# Patient Record
Sex: MALE | Race: WHITE | NOT HISPANIC OR LATINO | Employment: UNEMPLOYED | URBAN - METROPOLITAN AREA
[De-identification: names, ages, dates, MRNs, and addresses within clinical notes are randomized per-mention and may not be internally consistent; named-entity substitution may affect disease eponyms.]

---

## 2017-01-16 PROBLEM — M47.812 CERVICAL SPONDYLOARTHRITIS: Status: ACTIVE | Noted: 2017-01-16

## 2017-02-08 PROBLEM — M79.18 MYOFASCIAL PAIN: Status: ACTIVE | Noted: 2017-02-08

## 2017-02-23 NOTE — TELEPHONE ENCOUNTER
----- Message from Kendra Kirby sent at 2/23/2017  1:55 PM CST -----  Pricila, wife, calling for a refill on triamcinolone acetonide 0.1% (KENALOG) 0.1 % cream. Please send to [facility]. Patient wife can be reached at [phone] Thank you!

## 2017-02-24 RX ORDER — TRIAMCINOLONE ACETONIDE 1 MG/G
CREAM TOPICAL 3 TIMES DAILY
Qty: 454 G | Refills: 12 | Status: SHIPPED | OUTPATIENT
Start: 2017-02-24 | End: 2019-01-03 | Stop reason: SDUPTHER

## 2017-03-30 PROBLEM — M47.816 FACET ARTHRITIS, DEGENERATIVE, LUMBAR SPINE: Status: ACTIVE | Noted: 2017-03-30

## 2017-04-26 PROBLEM — M47.816 FACET HYPERTROPHY OF LUMBAR REGION: Status: ACTIVE | Noted: 2017-04-26

## 2017-04-26 PROBLEM — M47.816 SPONDYLOSIS OF LUMBAR REGION WITHOUT MYELOPATHY OR RADICULOPATHY: Chronic | Status: ACTIVE | Noted: 2017-04-26

## 2017-08-09 PROBLEM — M54.12 CERVICAL RADICULOPATHY: Status: ACTIVE | Noted: 2017-08-09

## 2017-09-21 PROBLEM — M48.061 BILATERAL STENOSIS OF LATERAL RECESS OF LUMBAR SPINE: Status: ACTIVE | Noted: 2017-09-21

## 2017-10-12 ENCOUNTER — TELEPHONE (OUTPATIENT)
Dept: FAMILY MEDICINE | Facility: CLINIC | Age: 62
End: 2017-10-12

## 2017-10-12 DIAGNOSIS — Z12.5 SCREENING FOR PROSTATE CANCER: ICD-10-CM

## 2017-10-12 DIAGNOSIS — Z00.00 ROUTINE MEDICAL EXAM: Primary | ICD-10-CM

## 2017-10-12 NOTE — TELEPHONE ENCOUNTER
Per GAGANDEEP Pizano pt had to reschedule appt but also needs labs done please put in orders and I will set before appt with Dr. DONIS .

## 2017-10-24 RX ORDER — MONTELUKAST SODIUM 10 MG/1
TABLET ORAL
Qty: 90 TABLET | Refills: 3 | Status: SHIPPED | OUTPATIENT
Start: 2017-10-24 | End: 2019-01-03 | Stop reason: SDUPTHER

## 2017-11-04 ENCOUNTER — LAB VISIT (OUTPATIENT)
Dept: LAB | Facility: HOSPITAL | Age: 62
End: 2017-11-04
Attending: INTERNAL MEDICINE
Payer: COMMERCIAL

## 2017-11-04 DIAGNOSIS — Z12.5 SCREENING FOR PROSTATE CANCER: ICD-10-CM

## 2017-11-04 DIAGNOSIS — Z00.00 ROUTINE MEDICAL EXAM: ICD-10-CM

## 2017-11-04 LAB
ALBUMIN SERPL BCP-MCNC: 4.1 G/DL
ALP SERPL-CCNC: 70 U/L
ALT SERPL W/O P-5'-P-CCNC: 23 U/L
ANION GAP SERPL CALC-SCNC: 13 MMOL/L
AST SERPL-CCNC: 23 U/L
BILIRUB SERPL-MCNC: 0.6 MG/DL
BUN SERPL-MCNC: 17 MG/DL
CALCIUM SERPL-MCNC: 9.7 MG/DL
CHLORIDE SERPL-SCNC: 106 MMOL/L
CO2 SERPL-SCNC: 25 MMOL/L
COMPLEXED PSA SERPL-MCNC: 1.2 NG/ML
CREAT SERPL-MCNC: 1 MG/DL
EST. GFR  (AFRICAN AMERICAN): >60 ML/MIN/1.73 M^2
EST. GFR  (NON AFRICAN AMERICAN): >60 ML/MIN/1.73 M^2
ESTIMATED AVG GLUCOSE: 123 MG/DL
GLUCOSE SERPL-MCNC: 92 MG/DL
HBA1C MFR BLD HPLC: 5.9 %
POTASSIUM SERPL-SCNC: 4.5 MMOL/L
PROT SERPL-MCNC: 7.8 G/DL
SODIUM SERPL-SCNC: 144 MMOL/L
TSH SERPL DL<=0.005 MIU/L-ACNC: 1.94 UIU/ML

## 2017-11-04 PROCEDURE — 83036 HEMOGLOBIN GLYCOSYLATED A1C: CPT

## 2017-11-04 PROCEDURE — 80053 COMPREHEN METABOLIC PANEL: CPT

## 2017-11-04 PROCEDURE — 36415 COLL VENOUS BLD VENIPUNCTURE: CPT | Mod: PO

## 2017-11-04 PROCEDURE — 84443 ASSAY THYROID STIM HORMONE: CPT

## 2017-11-04 PROCEDURE — 84153 ASSAY OF PSA TOTAL: CPT

## 2017-11-13 ENCOUNTER — OFFICE VISIT (OUTPATIENT)
Dept: FAMILY MEDICINE | Facility: CLINIC | Age: 62
End: 2017-11-13
Payer: COMMERCIAL

## 2017-11-13 VITALS
SYSTOLIC BLOOD PRESSURE: 140 MMHG | WEIGHT: 197.75 LBS | TEMPERATURE: 98 F | HEART RATE: 65 BPM | OXYGEN SATURATION: 94 % | HEIGHT: 66 IN | DIASTOLIC BLOOD PRESSURE: 100 MMHG | BODY MASS INDEX: 31.78 KG/M2

## 2017-11-13 DIAGNOSIS — E78.5 HYPERLIPIDEMIA, UNSPECIFIED HYPERLIPIDEMIA TYPE: ICD-10-CM

## 2017-11-13 DIAGNOSIS — Z12.5 SCREENING FOR PROSTATE CANCER: ICD-10-CM

## 2017-11-13 DIAGNOSIS — I10 ESSENTIAL HYPERTENSION: ICD-10-CM

## 2017-11-13 DIAGNOSIS — Z00.00 ROUTINE MEDICAL EXAM: Primary | ICD-10-CM

## 2017-11-13 PROCEDURE — 99396 PREV VISIT EST AGE 40-64: CPT | Mod: S$GLB,,, | Performed by: INTERNAL MEDICINE

## 2017-11-13 PROCEDURE — 99999 PR PBB SHADOW E&M-EST. PATIENT-LVL III: CPT | Mod: PBBFAC,,, | Performed by: INTERNAL MEDICINE

## 2017-11-13 RX ORDER — METHOCARBAMOL 750 MG/1
TABLET, FILM COATED ORAL
COMMUNITY
Start: 2017-10-25 | End: 2020-01-10 | Stop reason: SDUPTHER

## 2017-11-13 RX ORDER — AMLODIPINE BESYLATE 5 MG/1
5 TABLET ORAL DAILY
Qty: 30 TABLET | Refills: 11 | Status: SHIPPED | OUTPATIENT
Start: 2017-11-13 | End: 2018-07-17 | Stop reason: SDUPTHER

## 2017-11-13 RX ORDER — HYDROCODONE BITARTRATE AND ACETAMINOPHEN 10; 325 MG/1; MG/1
TABLET ORAL
COMMUNITY
Start: 2017-10-24 | End: 2018-12-12
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
## 2017-11-15 RX ORDER — VALSARTAN 320 MG/1
320 TABLET ORAL DAILY
Qty: 90 TABLET | Refills: 3 | Status: SHIPPED | OUTPATIENT
Start: 2017-11-15 | End: 2018-12-12

## 2017-11-15 NOTE — TELEPHONE ENCOUNTER
----- Message from Kia Means sent at 11/15/2017  9:57 AM CST -----  Contact: Self  Refill : valsartan (DIOVAN) 320 MG tablet ()      Pharmacy     [id number]  [place], [place] - [address] AT [facility] [address]

## 2018-07-18 RX ORDER — AMLODIPINE BESYLATE 5 MG/1
TABLET ORAL
Qty: 30 TABLET | Refills: 0 | Status: SHIPPED | OUTPATIENT
Start: 2018-07-18 | End: 2018-12-12 | Stop reason: SDUPTHER

## 2018-07-27 RX ORDER — IRBESARTAN 300 MG/1
300 TABLET ORAL NIGHTLY
Qty: 90 TABLET | Refills: 3 | Status: SHIPPED | OUTPATIENT
Start: 2018-07-27 | End: 2019-01-03 | Stop reason: SDUPTHER

## 2018-07-27 NOTE — TELEPHONE ENCOUNTER
----- Message from Kia Means sent at 7/27/2018  8:09 AM CDT -----  Contact: sELF   Pt calling to speak to a nurse regarding letter that he received regarding changing med below. [phone].      valsartan (DIOVAN) 320 MG tablet

## 2018-11-13 RX ORDER — AMLODIPINE BESYLATE 5 MG/1
TABLET ORAL
Qty: 30 TABLET | Refills: 0 | Status: SHIPPED | OUTPATIENT
Start: 2018-11-13 | End: 2019-01-03 | Stop reason: SDUPTHER

## 2018-11-28 ENCOUNTER — TELEPHONE (OUTPATIENT)
Dept: FAMILY MEDICINE | Facility: CLINIC | Age: 63
End: 2018-11-28

## 2018-11-28 DIAGNOSIS — I10 ESSENTIAL HYPERTENSION: ICD-10-CM

## 2018-11-28 DIAGNOSIS — Z00.00 ROUTINE MEDICAL EXAM: ICD-10-CM

## 2018-11-28 DIAGNOSIS — E78.6 HDL LIPOPROTEIN DEFICIENCY: ICD-10-CM

## 2018-11-28 DIAGNOSIS — M48.061 BILATERAL STENOSIS OF LATERAL RECESS OF LUMBAR SPINE: Primary | ICD-10-CM

## 2018-11-28 DIAGNOSIS — E55.9 VITAMIN D DEFICIENCY DISEASE: ICD-10-CM

## 2018-11-28 NOTE — TELEPHONE ENCOUNTER
----- Message from Mohsen Rainey sent at 11/28/2018  8:25 AM CST -----  Contact: Pricila/[phone]  The patient wife would like orders placed for blood work.        Thank you

## 2018-12-12 ENCOUNTER — OFFICE VISIT (OUTPATIENT)
Dept: FAMILY MEDICINE | Facility: CLINIC | Age: 63
End: 2018-12-12
Payer: COMMERCIAL

## 2018-12-12 ENCOUNTER — LAB VISIT (OUTPATIENT)
Dept: LAB | Facility: HOSPITAL | Age: 63
End: 2018-12-12
Attending: INTERNAL MEDICINE
Payer: COMMERCIAL

## 2018-12-12 VITALS
BODY MASS INDEX: 31.43 KG/M2 | HEART RATE: 90 BPM | TEMPERATURE: 98 F | WEIGHT: 195.56 LBS | HEIGHT: 66 IN | DIASTOLIC BLOOD PRESSURE: 88 MMHG | OXYGEN SATURATION: 96 % | SYSTOLIC BLOOD PRESSURE: 138 MMHG

## 2018-12-12 DIAGNOSIS — Z00.00 ROUTINE MEDICAL EXAM: Primary | ICD-10-CM

## 2018-12-12 DIAGNOSIS — E78.6 HDL LIPOPROTEIN DEFICIENCY: ICD-10-CM

## 2018-12-12 DIAGNOSIS — I10 ESSENTIAL HYPERTENSION: ICD-10-CM

## 2018-12-12 DIAGNOSIS — Z12.5 SCREENING FOR PROSTATE CANCER: ICD-10-CM

## 2018-12-12 DIAGNOSIS — Z12.11 SCREENING FOR COLORECTAL CANCER: ICD-10-CM

## 2018-12-12 DIAGNOSIS — E55.9 VITAMIN D DEFICIENCY DISEASE: ICD-10-CM

## 2018-12-12 DIAGNOSIS — E78.5 HYPERLIPIDEMIA, UNSPECIFIED HYPERLIPIDEMIA TYPE: ICD-10-CM

## 2018-12-12 DIAGNOSIS — Z00.00 ROUTINE MEDICAL EXAM: ICD-10-CM

## 2018-12-12 DIAGNOSIS — K21.9 GASTROESOPHAGEAL REFLUX DISEASE, ESOPHAGITIS PRESENCE NOT SPECIFIED: ICD-10-CM

## 2018-12-12 DIAGNOSIS — Z12.12 SCREENING FOR COLORECTAL CANCER: ICD-10-CM

## 2018-12-12 LAB
25(OH)D3+25(OH)D2 SERPL-MCNC: 39 NG/ML
ALBUMIN SERPL BCP-MCNC: 4 G/DL
ALP SERPL-CCNC: 84 U/L
ALT SERPL W/O P-5'-P-CCNC: 21 U/L
ANION GAP SERPL CALC-SCNC: 8 MMOL/L
AST SERPL-CCNC: 19 U/L
BASOPHILS # BLD AUTO: 0.07 K/UL
BASOPHILS NFR BLD: 0.8 %
BILIRUB SERPL-MCNC: 0.6 MG/DL
BUN SERPL-MCNC: 15 MG/DL
CALCIUM SERPL-MCNC: 9.6 MG/DL
CHLORIDE SERPL-SCNC: 107 MMOL/L
CHOLEST SERPL-MCNC: 195 MG/DL
CHOLEST/HDLC SERPL: 4.6 {RATIO}
CO2 SERPL-SCNC: 27 MMOL/L
COMPLEXED PSA SERPL-MCNC: 1 NG/ML
CREAT SERPL-MCNC: 0.9 MG/DL
DIFFERENTIAL METHOD: NORMAL
EOSINOPHIL # BLD AUTO: 0.3 K/UL
EOSINOPHIL NFR BLD: 3.7 %
ERYTHROCYTE [DISTWIDTH] IN BLOOD BY AUTOMATED COUNT: 14.2 %
EST. GFR  (AFRICAN AMERICAN): >60 ML/MIN/1.73 M^2
EST. GFR  (NON AFRICAN AMERICAN): >60 ML/MIN/1.73 M^2
GLUCOSE SERPL-MCNC: 112 MG/DL
HCT VFR BLD AUTO: 47.8 %
HDLC SERPL-MCNC: 42 MG/DL
HDLC SERPL: 21.5 %
HGB BLD-MCNC: 15.7 G/DL
IMM GRANULOCYTES # BLD AUTO: 0.04 K/UL
IMM GRANULOCYTES NFR BLD AUTO: 0.5 %
LDLC SERPL CALC-MCNC: 132.6 MG/DL
LYMPHOCYTES # BLD AUTO: 2.4 K/UL
LYMPHOCYTES NFR BLD: 28.1 %
MCH RBC QN AUTO: 28.6 PG
MCHC RBC AUTO-ENTMCNC: 32.8 G/DL
MCV RBC AUTO: 87 FL
MONOCYTES # BLD AUTO: 0.6 K/UL
MONOCYTES NFR BLD: 7.2 %
NEUTROPHILS # BLD AUTO: 5.2 K/UL
NEUTROPHILS NFR BLD: 59.7 %
NONHDLC SERPL-MCNC: 153 MG/DL
NRBC BLD-RTO: 0 /100 WBC
PLATELET # BLD AUTO: 235 K/UL
PMV BLD AUTO: 12.3 FL
POTASSIUM SERPL-SCNC: 4.5 MMOL/L
PROT SERPL-MCNC: 7.3 G/DL
RBC # BLD AUTO: 5.48 M/UL
SODIUM SERPL-SCNC: 142 MMOL/L
T4 FREE SERPL-MCNC: 0.81 NG/DL
TRIGL SERPL-MCNC: 102 MG/DL
TSH SERPL DL<=0.005 MIU/L-ACNC: 6.03 UIU/ML
WBC # BLD AUTO: 8.62 K/UL

## 2018-12-12 PROCEDURE — 84439 ASSAY OF FREE THYROXINE: CPT

## 2018-12-12 PROCEDURE — 99396 PREV VISIT EST AGE 40-64: CPT | Mod: S$GLB,,, | Performed by: INTERNAL MEDICINE

## 2018-12-12 PROCEDURE — 85025 COMPLETE CBC W/AUTO DIFF WBC: CPT

## 2018-12-12 PROCEDURE — 84153 ASSAY OF PSA TOTAL: CPT

## 2018-12-12 PROCEDURE — 99999 PR PBB SHADOW E&M-EST. PATIENT-LVL III: CPT | Mod: PBBFAC,,, | Performed by: INTERNAL MEDICINE

## 2018-12-12 PROCEDURE — 82306 VITAMIN D 25 HYDROXY: CPT

## 2018-12-12 PROCEDURE — 84443 ASSAY THYROID STIM HORMONE: CPT

## 2018-12-12 PROCEDURE — 36415 COLL VENOUS BLD VENIPUNCTURE: CPT | Mod: PO

## 2018-12-12 PROCEDURE — 80061 LIPID PANEL: CPT

## 2018-12-12 PROCEDURE — 80053 COMPREHEN METABOLIC PANEL: CPT

## 2018-12-12 RX ORDER — PANTOPRAZOLE SODIUM 40 MG/1
40 TABLET, DELAYED RELEASE ORAL
Qty: 90 TABLET | Refills: 12 | Status: SHIPPED | OUTPATIENT
Start: 2018-12-12 | End: 2019-02-27 | Stop reason: SDUPTHER
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
## 2018-12-16 ENCOUNTER — PATIENT MESSAGE (OUTPATIENT)
Dept: FAMILY MEDICINE | Facility: CLINIC | Age: 63
End: 2018-12-16

## 2018-12-20 ENCOUNTER — PATIENT MESSAGE (OUTPATIENT)
Dept: FAMILY MEDICINE | Facility: CLINIC | Age: 63
End: 2018-12-20

## 2019-01-03 NOTE — TELEPHONE ENCOUNTER
----- Message from Shamika Leija sent at 1/3/2019  3:40 PM CST -----  Contact: wife  Pts wife is calling to speak with staff about why pts amLODIPine (NORVASC) 5 MG tablet and irbesartan (AVAPRO) 300 MG tablet and montelukast (SINGULAIR) 10 mg tablet. Please call pt at [phone]       Pharmacy - [facility] - [address] AT [address] to [place] [id number]  [facility] [address]  Phone: [phone] Fax: [phone]

## 2019-01-04 RX ORDER — TRIAMCINOLONE ACETONIDE 1 MG/G
CREAM TOPICAL 3 TIMES DAILY
Qty: 454 G | Refills: 0 | Status: SHIPPED | OUTPATIENT
Start: 2019-01-04 | End: 2019-03-21 | Stop reason: SDUPTHER

## 2019-01-04 RX ORDER — MONTELUKAST SODIUM 10 MG/1
10 TABLET ORAL DAILY
Qty: 90 TABLET | Refills: 0 | Status: SHIPPED | OUTPATIENT
Start: 2019-01-04 | End: 2019-02-27 | Stop reason: SDUPTHER

## 2019-01-04 RX ORDER — AMLODIPINE BESYLATE 5 MG/1
TABLET ORAL
Qty: 30 TABLET | Refills: 0 | Status: SHIPPED | OUTPATIENT
Start: 2019-01-04 | End: 2019-02-27 | Stop reason: SDUPTHER

## 2019-01-04 RX ORDER — IRBESARTAN 300 MG/1
300 TABLET ORAL NIGHTLY
Qty: 90 TABLET | Refills: 0 | Status: SHIPPED | OUTPATIENT
Start: 2019-01-04 | End: 2019-02-27 | Stop reason: SDUPTHER

## 2019-02-27 NOTE — TELEPHONE ENCOUNTER
----- Message from Kayleigh Hill sent at 2/26/2019  2:43 PM CST -----  Contact: GERTRUDIS ZIMMERMAN [[id number]]  Can the clinic reply in [facility]:N    Please refill the medication(s) listed below.     Please call the patient when the prescription(s) is ready for  at the phone number   [phone]    Pt requesting 90 day supply for following refills:    Medication #1:pantoprazole (PROTONIX) 40 MG tablet      Medication #2:irbesartan (AVAPRO) 300 MG tablet    Medication#3:montelukast (SINGULAIR) 10 mg tablet    Medication#4:amLODIPine (NORVASC) 5 MG tablet    Preferred Pharmacy:[facility] - [place], [place] - [address] AT [address] to [facility] [phone]

## 2019-02-28 RX ORDER — PANTOPRAZOLE SODIUM 40 MG/1
40 TABLET, DELAYED RELEASE ORAL
Qty: 90 TABLET | Refills: 12 | Status: SHIPPED | OUTPATIENT
Start: 2019-02-28 | End: 2020-01-10 | Stop reason: SDUPTHER

## 2019-02-28 RX ORDER — AMLODIPINE BESYLATE 5 MG/1
TABLET ORAL
Qty: 30 TABLET | Refills: 12 | Status: SHIPPED | OUTPATIENT
Start: 2019-02-28 | End: 2020-01-10

## 2019-02-28 RX ORDER — MONTELUKAST SODIUM 10 MG/1
10 TABLET ORAL DAILY
Qty: 90 TABLET | Refills: 12 | Status: SHIPPED | OUTPATIENT
Start: 2019-02-28 | End: 2020-01-10 | Stop reason: SDUPTHER

## 2019-02-28 RX ORDER — IRBESARTAN 300 MG/1
300 TABLET ORAL NIGHTLY
Qty: 90 TABLET | Refills: 12 | Status: SHIPPED | OUTPATIENT
Start: 2019-02-28 | End: 2021-08-05 | Stop reason: SDUPTHER

## 2019-03-21 RX ORDER — TRIAMCINOLONE ACETONIDE 1 MG/G
CREAM TOPICAL
Qty: 454 G | Refills: 12 | Status: SHIPPED | OUTPATIENT
Start: 2019-03-21 | End: 2024-03-04

## 2019-03-21 RX ORDER — MONTELUKAST SODIUM 10 MG/1
TABLET ORAL
Qty: 90 TABLET | Refills: 12 | Status: SHIPPED | OUTPATIENT
Start: 2019-03-21 | End: 2021-08-05 | Stop reason: SDUPTHER

## 2019-03-21 RX ORDER — AMLODIPINE BESYLATE 5 MG/1
TABLET ORAL
Qty: 30 TABLET | Refills: 12 | Status: SHIPPED | OUTPATIENT
Start: 2019-03-21 | End: 2020-01-10 | Stop reason: SDUPTHER

## 2019-03-21 RX ORDER — IRBESARTAN 300 MG/1
TABLET ORAL
Qty: 90 TABLET | Refills: 12 | Status: SHIPPED | OUTPATIENT
Start: 2019-03-21 | End: 2022-01-12 | Stop reason: SDUPTHER

## 2019-08-20 ENCOUNTER — TELEPHONE (OUTPATIENT)
Dept: FAMILY MEDICINE | Facility: CLINIC | Age: 64
End: 2019-08-20

## 2019-08-21 ENCOUNTER — TELEPHONE (OUTPATIENT)
Dept: FAMILY MEDICINE | Facility: CLINIC | Age: 64
End: 2019-08-21

## 2019-08-21 NOTE — TELEPHONE ENCOUNTER
----- Message from Christine Young sent at 8/21/2019  9:09 AM CDT -----  Contact: Self   Type:  Patient Returning Call    Who Called:  Self     Who Left Message for Patient: Dahlia     Does the patient know what this is regarding? No     Would the patient rather a call back or a response via [facility]?  Call     Best Call Back Number: [phone]

## 2019-11-01 RX ORDER — IRBESARTAN 300 MG/1
TABLET ORAL
Qty: 30 TABLET | Refills: 0 | Status: SHIPPED | OUTPATIENT
Start: 2019-11-01 | End: 2021-08-05 | Stop reason: SDUPTHER

## 2019-12-31 ENCOUNTER — TELEPHONE (OUTPATIENT)
Dept: FAMILY MEDICINE | Facility: CLINIC | Age: 64
End: 2019-12-31

## 2019-12-31 DIAGNOSIS — Z00.00 ROUTINE MEDICAL EXAM: Primary | ICD-10-CM

## 2020-01-06 NOTE — TELEPHONE ENCOUNTER
"----- Message from Heather Velasco sent at 12/31/2019 10:11 AM CST -----  Contact: Pricila"wife" [phone]  Type: Lab    Caller is requesting to schedule their Lab appointment prior to annual appointment.    Order is not listed in EPIC.  Please enter order and contact patient to schedule.    Name of Caller:Pricila "wife"    Preferred Date and Time of Labs:1 wk prior to appointment    Date of [facility] Appointment:1/10/20    Where would they like the lab performed? [facility]    Would the patient rather a call back or a response via [facility]? Call back    Best Call Back Number: [phone]      "
Fasting labs in  
Lab appt scheduled  
(0) independent

## 2020-01-07 ENCOUNTER — LAB VISIT (OUTPATIENT)
Dept: LAB | Facility: HOSPITAL | Age: 65
End: 2020-01-07
Attending: INTERNAL MEDICINE
Payer: COMMERCIAL

## 2020-01-07 DIAGNOSIS — Z00.00 ROUTINE MEDICAL EXAM: ICD-10-CM

## 2020-01-07 LAB
ALBUMIN SERPL BCP-MCNC: 4.3 G/DL (ref 3.5–5.2)
ALP SERPL-CCNC: 69 U/L (ref 55–135)
ALT SERPL W/O P-5'-P-CCNC: 27 U/L (ref 10–44)
ANION GAP SERPL CALC-SCNC: 9 MMOL/L (ref 8–16)
AST SERPL-CCNC: 24 U/L (ref 10–40)
BASOPHILS # BLD AUTO: 0.07 K/UL (ref 0–0.2)
BASOPHILS NFR BLD: 0.8 % (ref 0–1.9)
BILIRUB SERPL-MCNC: 0.7 MG/DL (ref 0.1–1)
BUN SERPL-MCNC: 18 MG/DL (ref 8–23)
CALCIUM SERPL-MCNC: 9.4 MG/DL (ref 8.7–10.5)
CHLORIDE SERPL-SCNC: 104 MMOL/L (ref 95–110)
CHOLEST SERPL-MCNC: 216 MG/DL (ref 120–199)
CHOLEST/HDLC SERPL: 4.6 {RATIO} (ref 2–5)
CO2 SERPL-SCNC: 26 MMOL/L (ref 23–29)
CREAT SERPL-MCNC: 1 MG/DL (ref 0.5–1.4)
DIFFERENTIAL METHOD: NORMAL
EOSINOPHIL # BLD AUTO: 0.4 K/UL (ref 0–0.5)
EOSINOPHIL NFR BLD: 4.5 % (ref 0–8)
ERYTHROCYTE [DISTWIDTH] IN BLOOD BY AUTOMATED COUNT: 14.4 % (ref 11.5–14.5)
EST. GFR  (AFRICAN AMERICAN): >60 ML/MIN/1.73 M^2
EST. GFR  (NON AFRICAN AMERICAN): >60 ML/MIN/1.73 M^2
ESTIMATED AVG GLUCOSE: 143 MG/DL (ref 68–131)
GLUCOSE SERPL-MCNC: 110 MG/DL (ref 70–110)
HBA1C MFR BLD HPLC: 6.6 % (ref 4–5.6)
HCT VFR BLD AUTO: 46.3 % (ref 40–54)
HDLC SERPL-MCNC: 47 MG/DL (ref 40–75)
HDLC SERPL: 21.8 % (ref 20–50)
HGB BLD-MCNC: 14.8 G/DL (ref 14–18)
IMM GRANULOCYTES # BLD AUTO: 0.02 K/UL (ref 0–0.04)
IMM GRANULOCYTES NFR BLD AUTO: 0.2 % (ref 0–0.5)
LDLC SERPL CALC-MCNC: 136.4 MG/DL (ref 63–159)
LYMPHOCYTES # BLD AUTO: 2.4 K/UL (ref 1–4.8)
LYMPHOCYTES NFR BLD: 28.9 % (ref 18–48)
MCH RBC QN AUTO: 28.8 PG (ref 27–31)
MCHC RBC AUTO-ENTMCNC: 32 G/DL (ref 32–36)
MCV RBC AUTO: 90 FL (ref 82–98)
MONOCYTES # BLD AUTO: 0.6 K/UL (ref 0.3–1)
MONOCYTES NFR BLD: 7.4 % (ref 4–15)
NEUTROPHILS # BLD AUTO: 4.9 K/UL (ref 1.8–7.7)
NEUTROPHILS NFR BLD: 58.2 % (ref 38–73)
NONHDLC SERPL-MCNC: 169 MG/DL
NRBC BLD-RTO: 0 /100 WBC
PLATELET # BLD AUTO: 229 K/UL (ref 150–350)
PMV BLD AUTO: 12.1 FL (ref 9.2–12.9)
POTASSIUM SERPL-SCNC: 3.8 MMOL/L (ref 3.5–5.1)
PROT SERPL-MCNC: 7.6 G/DL (ref 6–8.4)
RBC # BLD AUTO: 5.14 M/UL (ref 4.6–6.2)
SODIUM SERPL-SCNC: 139 MMOL/L (ref 136–145)
T4 FREE SERPL-MCNC: 0.88 NG/DL (ref 0.71–1.51)
TRIGL SERPL-MCNC: 163 MG/DL (ref 30–150)
TSH SERPL DL<=0.005 MIU/L-ACNC: 3.31 UIU/ML (ref 0.4–4)
WBC # BLD AUTO: 8.41 K/UL (ref 3.9–12.7)

## 2020-01-07 PROCEDURE — 36415 COLL VENOUS BLD VENIPUNCTURE: CPT | Mod: PO

## 2020-01-07 PROCEDURE — 82306 VITAMIN D 25 HYDROXY: CPT

## 2020-01-07 PROCEDURE — 84439 ASSAY OF FREE THYROXINE: CPT

## 2020-01-07 PROCEDURE — 83036 HEMOGLOBIN GLYCOSYLATED A1C: CPT

## 2020-01-07 PROCEDURE — 85025 COMPLETE CBC W/AUTO DIFF WBC: CPT

## 2020-01-07 PROCEDURE — 80061 LIPID PANEL: CPT

## 2020-01-07 PROCEDURE — 80053 COMPREHEN METABOLIC PANEL: CPT

## 2020-01-07 PROCEDURE — 84443 ASSAY THYROID STIM HORMONE: CPT

## 2020-01-07 PROCEDURE — 84153 ASSAY OF PSA TOTAL: CPT

## 2020-01-08 LAB
25(OH)D3+25(OH)D2 SERPL-MCNC: 35 NG/ML (ref 30–96)
COMPLEXED PSA SERPL-MCNC: 1.2 NG/ML (ref 0–4)

## 2020-01-10 ENCOUNTER — OFFICE VISIT (OUTPATIENT)
Dept: FAMILY MEDICINE | Facility: CLINIC | Age: 65
End: 2020-01-10
Payer: COMMERCIAL

## 2020-01-10 VITALS
TEMPERATURE: 98 F | WEIGHT: 191.81 LBS | HEIGHT: 66 IN | DIASTOLIC BLOOD PRESSURE: 88 MMHG | OXYGEN SATURATION: 96 % | SYSTOLIC BLOOD PRESSURE: 118 MMHG | BODY MASS INDEX: 30.82 KG/M2 | HEART RATE: 67 BPM

## 2020-01-10 DIAGNOSIS — I10 ESSENTIAL HYPERTENSION: ICD-10-CM

## 2020-01-10 DIAGNOSIS — E11.9 NEWLY DIAGNOSED DIABETES: ICD-10-CM

## 2020-01-10 DIAGNOSIS — E78.5 HYPERLIPIDEMIA, UNSPECIFIED HYPERLIPIDEMIA TYPE: ICD-10-CM

## 2020-01-10 DIAGNOSIS — Z12.5 SCREENING FOR PROSTATE CANCER: ICD-10-CM

## 2020-01-10 DIAGNOSIS — Z00.00 ROUTINE MEDICAL EXAM: Primary | ICD-10-CM

## 2020-01-10 DIAGNOSIS — E78.6 HDL LIPOPROTEIN DEFICIENCY: ICD-10-CM

## 2020-01-10 DIAGNOSIS — K21.9 GASTROESOPHAGEAL REFLUX DISEASE, ESOPHAGITIS PRESENCE NOT SPECIFIED: ICD-10-CM

## 2020-01-10 DIAGNOSIS — E55.9 VITAMIN D DEFICIENCY DISEASE: ICD-10-CM

## 2020-01-10 DIAGNOSIS — M1A.9XX0 CHRONIC GOUT WITHOUT TOPHUS, UNSPECIFIED CAUSE, UNSPECIFIED SITE: ICD-10-CM

## 2020-01-10 DIAGNOSIS — E11.65 UNCONTROLLED TYPE 2 DIABETES MELLITUS WITH HYPERGLYCEMIA: ICD-10-CM

## 2020-01-10 DIAGNOSIS — Z12.12 SCREENING FOR COLORECTAL CANCER: ICD-10-CM

## 2020-01-10 DIAGNOSIS — Z12.11 SCREENING FOR COLORECTAL CANCER: ICD-10-CM

## 2020-01-10 PROCEDURE — 99999 PR PBB SHADOW E&M-EST. PATIENT-LVL IV: ICD-10-PCS | Mod: PBBFAC,,, | Performed by: INTERNAL MEDICINE

## 2020-01-10 PROCEDURE — 99999 PR PBB SHADOW E&M-EST. PATIENT-LVL IV: CPT | Mod: PBBFAC,,, | Performed by: INTERNAL MEDICINE

## 2020-01-10 PROCEDURE — 99396 PR PREVENTIVE VISIT,EST,40-64: ICD-10-PCS | Mod: S$GLB,,, | Performed by: INTERNAL MEDICINE

## 2020-01-10 PROCEDURE — 99396 PREV VISIT EST AGE 40-64: CPT | Mod: S$GLB,,, | Performed by: INTERNAL MEDICINE

## 2020-01-10 RX ORDER — AMLODIPINE BESYLATE 5 MG/1
TABLET ORAL
Qty: 90 TABLET | Refills: 3 | Status: SHIPPED | OUTPATIENT
Start: 2020-01-10 | End: 2020-11-10 | Stop reason: SDUPTHER

## 2020-01-10 RX ORDER — METHOCARBAMOL 750 MG/1
750 TABLET, FILM COATED ORAL EVERY 8 HOURS PRN
Qty: 90 TABLET | Refills: 12 | Status: SHIPPED | OUTPATIENT
Start: 2020-01-10 | End: 2021-08-05

## 2020-01-10 RX ORDER — PANTOPRAZOLE SODIUM 40 MG/1
40 TABLET, DELAYED RELEASE ORAL
Qty: 90 TABLET | Refills: 12 | Status: SHIPPED | OUTPATIENT
Start: 2020-01-10 | End: 2020-11-10 | Stop reason: SDUPTHER

## 2020-01-10 RX ORDER — MONTELUKAST SODIUM 10 MG/1
10 TABLET ORAL DAILY
Qty: 90 TABLET | Refills: 12 | Status: SHIPPED | OUTPATIENT
Start: 2020-01-10 | End: 2020-11-10 | Stop reason: SDUPTHER

## 2020-01-10 RX ORDER — INDOMETHACIN 25 MG/1
25 CAPSULE ORAL
Qty: 60 CAPSULE | Refills: 1 | Status: SHIPPED | OUTPATIENT
Start: 2020-01-10 | End: 2020-07-07

## 2020-01-10 RX ORDER — ALLOPURINOL 100 MG/1
100 TABLET ORAL DAILY
Qty: 90 TABLET | Refills: 90 | Status: SHIPPED | OUTPATIENT
Start: 2020-01-10 | End: 2021-02-03 | Stop reason: SDUPTHER
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
## 2020-01-27 ENCOUNTER — PATIENT OUTREACH (OUTPATIENT)
Dept: ADMINISTRATIVE | Facility: OTHER | Age: 65
End: 2020-01-27

## 2020-01-27 NOTE — PROGRESS NOTES
LINKS immunization registry, Care Everywhere and Health Maintenance updated.  Chart reviewed for overdue Proactive [facility] Encounters health maintenance testing.  Patient has open case request for colonoscopy.  Fitkit order not entered   Spontaneous, unlabored and symmetrical

## 2020-01-28 ENCOUNTER — TELEPHONE (OUTPATIENT)
Dept: GASTROENTEROLOGY | Facility: CLINIC | Age: 65
End: 2020-01-28

## 2020-01-28 ENCOUNTER — CLINICAL SUPPORT (OUTPATIENT)
Dept: DIABETES | Facility: CLINIC | Age: 65
End: 2020-01-28
Payer: COMMERCIAL

## 2020-01-28 ENCOUNTER — PATIENT MESSAGE (OUTPATIENT)
Dept: GASTROENTEROLOGY | Facility: CLINIC | Age: 65
End: 2020-01-28

## 2020-01-28 VITALS — BODY MASS INDEX: 31.03 KG/M2 | WEIGHT: 192.25 LBS

## 2020-01-28 DIAGNOSIS — E11.9 NEWLY DIAGNOSED DIABETES: ICD-10-CM

## 2020-01-28 DIAGNOSIS — E11.9 TYPE 2 DIABETES MELLITUS WITHOUT COMPLICATION, WITHOUT LONG-TERM CURRENT USE OF INSULIN: Primary | ICD-10-CM

## 2020-01-28 PROCEDURE — 99999 PR PBB SHADOW E&M-EST. PATIENT-LVL I: CPT | Mod: PBBFAC,,, | Performed by: DIETITIAN, REGISTERED

## 2020-01-28 PROCEDURE — 99999 PR PBB SHADOW E&M-EST. PATIENT-LVL I: ICD-10-PCS | Mod: PBBFAC,,, | Performed by: DIETITIAN, REGISTERED

## 2020-01-28 PROCEDURE — G0108 DIAB MANAGE TRN  PER INDIV: HCPCS | Mod: S$GLB,,, | Performed by: DIETITIAN, REGISTERED

## 2020-01-28 PROCEDURE — G0108 PR DIAB MANAGE TRN  PER INDIV: ICD-10-PCS | Mod: S$GLB,,, | Performed by: DIETITIAN, REGISTERED

## 2020-01-28 NOTE — TELEPHONE ENCOUNTER
Colonoscopy scheduled 2/7 at 12pm arrive for 11am  instructions reviewed and patient states understanding. Copy sent to patient portal.

## 2020-01-28 NOTE — TELEPHONE ENCOUNTER
----- Message from Rubi Albarran sent at 1/28/2020 11:00 AM CST -----  Type: Needs Medical Advice    Who Called:  Patient   Best Call Back Number:[phone]   Additional Information: patient [facility] PCP put in a referral for a colonoscopy, please contact to schedule

## 2020-01-28 NOTE — PROGRESS NOTES
Diabetes Education  Author: Reina Cobb RD  Date: 1/28/2020    Diabetes Care Management Summary  Pt visit today focused on introducing NEWLY DX  T2DM pt to diabetes self management w emphasis on CHO awareness/counting, meal planning/label reading, SMBG, exercise, and meds.  Diabetes Education Record Assessment/Progress: Initial  Current Diabetes Risk Level: Low     Diabetes Type  Diabetes Type : Type II  Diabetes History  Diabetes Diagnosis: 0-1 year  Current Treatment: Diet  Reviewed Problem List with Patient: Yes    Health Maintenance was reviewed today with patient. Discussed with patient importance of routine eye exams, foot exams/foot care, blood work (i.e.: A1c, microalbumin, and lipid), dental visits, yearly flu vaccine, and pneumonia vaccine as indicated by PCP. Patient verbalized understanding.     Health Maintenance Topics with due status: Not Due       Topic Last Completion Date    TETANUS VACCINE 06/20/2016    PROSTATE-SPECIFIC ANTIGEN 01/07/2020    Lipid Panel 01/07/2020     Health Maintenance Due   Topic Date Due    Hepatitis C Screening  1955    Colonoscopy  01/07/2015       Nutrition  Meal Planning: water, 3 meals per day, diet drinks, drinks regular soda, eats out often, snacks between meal(Estimated carb for diet recall for one day 645g)  What type of sweetener do you use?: sugar  What type of beverages do you drink?: juice, milk  Meal Plan 24 Hour Recall - Breakfast: 530am: 1-2 [employer] biscuit w egg, cheese, meat, 1 banana, coke ZERO (est. total carb=78 g carb)  Meal Plan 24 Hour Recall - Lunch: 11am: cheeseBurger from diner, fully dressed w ffries and ketchup, Iced Tea w sugar (est. total carb=163 g carb)  Meal Plan 24 Hour Recall - Dinner: 530-6pm: 4 cups red beans and rice, cornbread or 2 sl buttered bread, raw onions, decaf tea w sugar or coke ZERO (est. total carb=180-210 g carb)  Meal Plan 24 Hour Recall - Snack: 8-9am: 1-2 bags patricia chip cookies (est. total carb=54 g  "carb); 2-3pm: 1-2 bags cheetos (est. total carb=40g carb), 7-8pm: 3 cups reg ice cream topped w whole banana, patricia syrup and whipped cream (est. total carb=130 g carb)    Monitoring   Monitoring: Other  Self Monitoring : has not been able to operate the meter correctly  Blood Glucose Logs: No  Do you use a personal continuous glucose monitor?: No  In the last month, how often have you had a low blood sugar reaction?: never  Can you tell when your blood sugar is too high?: no    Exercise   Exercise Type: (none due to back pain when walking)    Current Diabetes Treatment   Current Treatment: Diet    Social History  Preferred Learning Method: Face to Face  Primary Support: Self, Spouse  Educational Level: High School  Occupation:  at [employer]  Smoking Status: Never a Smoker  Alcohol Use: Rarely  Barriers to Change:none  Learning Challenges : None  Readiness to Learn   Readiness to Learn : Eager  Cultural Influences  Cultural Influences: No    Diabetes Education Assessment/Progress  Diabetes Disease Process (diabetes disease process and treatment options): Discussion, Instructed, Written Materials Provided, Individual Session, Demonstrates Understanding/Competency(verbalizes/demonstrates)  Nutrition (Incorporating nutritional management into one's lifestyle): Individual Session, Written Materials Provided, Instructed, Demonstrates Understanding/Competency (verbalizes/demonstrates), Discussion  -Diet recall indicates an extremely high carb intake (estimated from 500-645g carb/day from diet recall) on most day mostly from sweet snacks, beverages and breads and other starches at every meal. Diet limited in whole grains, fresh fruit other than banana, and low fat milk and dairy  -Pt stated he has been on a very low carb diet for past 2 weeks and feels like he is "starving"   -Discussed carb vs non-carb foods. Discussed appropriate amount of carbs to have at meals/snacks. Discussed appropriate serving sizes of " "individual carb items.  - Instructed on appropriate label reading and serving sizes of specific carb containing foods., portion control (hand) and using the plate method of meal planning.   -Encouraged carb sources primarily from whole grains, fresh/frozen fruits, and low-fat milk and yogurt and to avoid using reg sugar in beverages and limit sweetened dessert or snack items  -Discussed meal plans and snack ideas amenable to pt.  -Instructed pt to aim for 3 evenly-spaced meals with 45-60 gm carb/meal and 0-15 gm at snacks.    Physical Activity (incorporating physical activity into one's lifestyle): Individual Session, Written Materials Provided, Instructed, Discussion, Comprehends Key Points  -pt stated he does not exercise due to back pain but does admit to owing a stationary bike   Discussed benefits of physical activity on BG control. Encouraged starting with small manageable goals such as bike riding or using an arm ergometer  For 2 to 3 ten-minute sessions per day for a total of 150 minutes per week while keeping 3 components in mind: Frequency- 3-5x/wk, Duration- 30 min, Intensity- can say name but "not sing a song"    Medications (states correct name, dose, onset, peak, duration, side effects & timing of meds): Discussion, Written Materials Provided, Individual Session  Monitoring (monitoring blood glucose/other parameters & using results): Individual Session, Written Materials Provided, Discussion, Demonstration, Comprehends Key Points, Instructed  -pt purchased meter but sttes he cant get it to work. Reviewed the correct BG testing process using the TrueMetrix meter. Pt stated he recognized his error in gathering blood sample and will re-try checking BG a ffew times a week  --Reviewed A1c goal of 7 % or less and BGgoals of  pre meal/fasting, <180 2hrpp.   Discussed BG readings and how to use data in DM self management;    Acute Complications (preventing, detecting, and treating acute complications): " Discussion, Individual Session, Written Materials Provided  Chronic Complications (preventing, detecting, and treating chronic complications): Discussion, Individual Session, Written Materials Provided  Clinical (diabetes, other pertinent medical history, and relevant comorbidities reviewed during visit): Discussion, Individual Session  Cognitive (knowledge of self-management skills, functional health literacy): Discussion, Individual Session  Psychosocial (emotional response to diabetes): Discussion, Individual Session  Diabetes Distress and Support Systems: Discussion, Individual Session  Behavioral (readiness for change, lifestyle practices, self-care behaviors): Discussion, Individual Session    Goals  Patient has selected/evaluated goals during today's session: Yes, selected  Healthy Eating: Set(reduce carb intake to 45-60 g/meal w 3 meals/day and limited snacks and reg sugar)  Start Date: 01/28/20  Target Date: 02/28/20  Physical Activity: Set(PA 3-5x/wk, 30+min duration)  Start Date: 01/28/20  Target Date: 02/28/20    Diabetes Care Plan/Intervention  Education Plan/Intervention: Individual Follow-Up DSMT(pt will schedule follow up nearer to home in [place])    Diabetes Meal Plan  Restrictions: Restricted Carbohydrate  Carbohydrate Per Meal: 45-60g  Carbohydrate Per Snack : 15-20g    Today's Self-Management Care Plan was developed with the patient's input and is based on barriers identified during today's assessment.    The long and short-term goals in the care plan were written with the patient/caregiver's input. The patient has agreed to work toward these goals to improve his overall diabetes control.      The patient received a copy of today's self-management plan and verbalized understanding of the care plan, goals, and all of today's instructions.      The patient was encouraged to communicate with his physician and care team regarding his condition(s) and treatment.  I provided the patient with my  contact information today and encouraged him to contact me via phone or patient portal as needed.     Education Units of Time   Time Spent: 60 min

## 2020-02-07 ENCOUNTER — ANESTHESIA (OUTPATIENT)
Dept: ENDOSCOPY | Facility: HOSPITAL | Age: 65
End: 2020-02-07
Payer: COMMERCIAL

## 2020-02-07 ENCOUNTER — ANESTHESIA EVENT (OUTPATIENT)
Dept: ENDOSCOPY | Facility: HOSPITAL | Age: 65
End: 2020-02-07
Payer: COMMERCIAL

## 2020-02-07 ENCOUNTER — HOSPITAL ENCOUNTER (OUTPATIENT)
Facility: HOSPITAL | Age: 65
Discharge: HOME | End: 2020-02-07
Attending: INTERNAL MEDICINE | Admitting: INTERNAL MEDICINE
Payer: COMMERCIAL

## 2020-02-07 DIAGNOSIS — Z12.11 SCREEN FOR COLON CANCER: ICD-10-CM

## 2020-02-07 DIAGNOSIS — K63.5 POLYP OF COLON, UNSPECIFIED PART OF COLON, UNSPECIFIED TYPE: ICD-10-CM

## 2020-02-07 DIAGNOSIS — Z86.010 HISTORY OF COLON POLYPS: ICD-10-CM

## 2020-02-07 DIAGNOSIS — Z11.59 NEED FOR HEPATITIS C SCREENING TEST: ICD-10-CM

## 2020-02-07 DIAGNOSIS — K57.90 DIVERTICULOSIS: ICD-10-CM

## 2020-02-07 DIAGNOSIS — K64.8 INTERNAL HEMORRHOIDS: Primary | ICD-10-CM

## 2020-02-07 PROCEDURE — D9220A PRA ANESTHESIA: ICD-10-PCS | Mod: 33,CRNA,, | Performed by: NURSE ANESTHETIST, CERTIFIED REGISTERED

## 2020-02-07 PROCEDURE — D9220A PRA ANESTHESIA: Mod: 33,CRNA,, | Performed by: NURSE ANESTHETIST, CERTIFIED REGISTERED

## 2020-02-07 PROCEDURE — D9220A PRA ANESTHESIA: ICD-10-PCS | Mod: 33,ANES,, | Performed by: ANESTHESIOLOGY

## 2020-02-07 PROCEDURE — 45385 COLONOSCOPY W/LESION REMOVAL: CPT | Performed by: INTERNAL MEDICINE

## 2020-02-07 PROCEDURE — 63600175 PHARM REV CODE 636 W HCPCS: Performed by: NURSE ANESTHETIST, CERTIFIED REGISTERED

## 2020-02-07 PROCEDURE — 27201089 HC SNARE, DISP (ANY): Performed by: INTERNAL MEDICINE

## 2020-02-07 PROCEDURE — 37000009 HC ANESTHESIA EA ADD 15 MINS: Performed by: INTERNAL MEDICINE

## 2020-02-07 PROCEDURE — 45380 COLONOSCOPY AND BIOPSY: CPT | Mod: 59,,, | Performed by: INTERNAL MEDICINE

## 2020-02-07 PROCEDURE — 45380 PR COLONOSCOPY,BIOPSY: ICD-10-PCS | Mod: 59,,, | Performed by: INTERNAL MEDICINE

## 2020-02-07 PROCEDURE — 45385 PR COLONOSCOPY,REMV LESN,SNARE: ICD-10-PCS | Mod: 33,,, | Performed by: INTERNAL MEDICINE

## 2020-02-07 PROCEDURE — 45380 COLONOSCOPY AND BIOPSY: CPT | Performed by: INTERNAL MEDICINE

## 2020-02-07 PROCEDURE — 63600175 PHARM REV CODE 636 W HCPCS: Performed by: INTERNAL MEDICINE

## 2020-02-07 PROCEDURE — 45385 COLONOSCOPY W/LESION REMOVAL: CPT | Mod: 33,,, | Performed by: INTERNAL MEDICINE

## 2020-02-07 PROCEDURE — 88305 TISSUE EXAM BY PATHOLOGIST: CPT | Mod: 59 | Performed by: PATHOLOGY

## 2020-02-07 PROCEDURE — 88305 TISSUE EXAM BY PATHOLOGIST: ICD-10-PCS | Mod: 26,,, | Performed by: PATHOLOGY

## 2020-02-07 PROCEDURE — 37000008 HC ANESTHESIA 1ST 15 MINUTES: Performed by: INTERNAL MEDICINE

## 2020-02-07 PROCEDURE — 88305 TISSUE EXAM BY PATHOLOGIST: CPT | Mod: 26,,, | Performed by: PATHOLOGY

## 2020-02-07 PROCEDURE — D9220A PRA ANESTHESIA: Mod: 33,ANES,, | Performed by: ANESTHESIOLOGY

## 2020-02-07 PROCEDURE — 27201012 HC FORCEPS, HOT/COLD, DISP: Performed by: INTERNAL MEDICINE

## 2020-02-07 RX ORDER — PROPOFOL 10 MG/ML
VIAL (ML) INTRAVENOUS
Status: DISCONTINUED | OUTPATIENT
Start: 2020-02-07 | End: 2020-02-07

## 2020-02-07 RX ORDER — LIDOCAINE HYDROCHLORIDE 20 MG/ML
INJECTION INTRAVENOUS
Status: DISCONTINUED | OUTPATIENT
Start: 2020-02-07 | End: 2020-02-07

## 2020-02-07 RX ORDER — SODIUM CHLORIDE 9 MG/ML
INJECTION, SOLUTION INTRAVENOUS CONTINUOUS
Status: DISCONTINUED | OUTPATIENT
Start: 2020-02-07 | End: 2020-02-07 | Stop reason: HOSPADM

## 2020-02-07 RX ADMIN — PROPOFOL 50 MG: 10 INJECTION, EMULSION INTRAVENOUS at 01:02

## 2020-02-07 RX ADMIN — LIDOCAINE HYDROCHLORIDE 100 MG: 20 INJECTION, SOLUTION INTRAVENOUS at 12:02

## 2020-02-07 RX ADMIN — SODIUM CHLORIDE 1000 ML: 0.9 INJECTION, SOLUTION INTRAVENOUS at 11:02

## 2020-02-07 RX ADMIN — PROPOFOL 50 MG: 10 INJECTION, EMULSION INTRAVENOUS at 12:02

## 2020-02-07 RX ADMIN — PROPOFOL 100 MG: 10 INJECTION, EMULSION INTRAVENOUS at 12:02

## 2020-02-07 NOTE — [employer] PATIENT INSTRUCTIONS
Discharge Summary/Instructions after an Endoscopic Procedure  Patient Name: Preston Kirby  Patient MRN: [id number]  Patient YOB: 1955  Friday, February 07, 2020  Ronal Ayala MD  RESTRICTIONS:  During your procedure today, you received medications for sedation.  These   medications may affect your judgment, balance and coordination.  Therefore,   for 24 hours, you have the following restrictions:   - DO NOT drive a car, operate machinery, make legal/financial decisions,   sign important papers or drink alcohol.    ACTIVITY:  Today: no heavy lifting, straining or running due to procedural   sedation/anesthesia.  The following day: return to full activity including work.  DIET:  Eat and drink normally unless instructed otherwise.     TREATMENT FOR COMMON SIDE EFFECTS:  - Mild abdominal pain, nausea, belching, bloating or excessive gas:  rest,   eat lightly and use a heating pad.  - Sore Throat: treat with throat lozenges and/or gargle with warm salt   water.  - Because air was used during the procedure, expelling large amounts of air   from your rectum or belching is normal.  - If a bowel prep was taken, you may not have a bowel movement for 1-3 days.    This is normal.  SYMPTOMS TO WATCH FOR AND REPORT TO YOUR PHYSICIAN:  1. Abdominal pain or bloating, other than gas cramps.  2. Chest pain.  3. Back pain.  4. Signs of infection such as: chills or fever occurring within 24 hours   after the procedure.  5. Rectal bleeding, which would show as bright red, maroon, or black stools.   (A tablespoon of blood from the rectum is not serious, especially if   hemorrhoids are present.)  6. Vomiting.  7. Weakness or dizziness.  GO DIRECTLY TO THE NEAREST EMERGENCY ROOM IF YOU HAVE ANY OF THE FOLLOWING:      Difficulty breathing              Chills and/or fever over 101 F   Persistent vomiting and/or vomiting blood   Severe abdominal pain   Severe chest pain   Black, tarry stools   Bleeding- more than one  tablespoon   Any other symptom or condition that you feel may need urgent attention  Your doctor recommends these additional instructions:  If any biopsies were taken, your doctors clinic will contact you in 1 to 2   weeks with any results.  - Patient has a contact number available for emergencies.  The signs and   symptoms of potential delayed complications were discussed with the   patient.  Return to normal activities tomorrow.  Written discharge   instructions were provided to the patient.   - High fiber diet.   - Continue present medications.   - Await pathology results.   - Repeat colonoscopy in 3 years for surveillance.   - Discharge patient to home (ambulatory).   - Return to my office after studies are complete.  If appendiceal biopsy   shows adenomatous tissue then may need surgical consultation.  For questions, problems or results please call your physician - Ronal Ayala MD at Work:  [phone].  [facility], EMERGENCY ROOM PHONE NUMBER: [phone]  IF A COMPLICATION OR EMERGENCY SITUATION ARISES AND YOU ARE UNABLE TO REACH   YOUR PHYSICIAN - GO DIRECTLY TO THE EMERGENCY ROOM.  Ronal Ayala MD  2/7/2020 1:25:58 PM  This report has been verified and signed electronically.  [employer]

## 2020-02-07 NOTE — ANESTHESIA PREPROCEDURE EVALUATION
02/07/2020  Preston Kirby is a 64 y.o., male.    Anesthesia Evaluation    I have reviewed the Patient Summary Reports.    I have reviewed the Nursing Notes.   I have reviewed the Medications.     Review of Systems  Anesthesia Hx:  No problems with previous Anesthesia    Social:  Non-Smoker    Hematology/Oncology:         -- Cancer in past history:   Cardiovascular:   Hypertension hyperlipidemia    Pulmonary:   Sleep Apnea    Renal/:   Chronic Renal Disease    Hepatic/GI:   GERD    Musculoskeletal:   Arthritis     Neurological:   Neuromuscular Disease,    Endocrine:   Diabetes, type 2        Physical Exam  General:  Well nourished    Airway/Jaw/Neck:  Airway Findings: Mouth Opening: Normal Tongue: Normal  General Airway Assessment: Adult  Mallampati: II        Eyes/Ears/Nose:  EYES/EARS/NOSE FINDINGS: Normal   Dental:  Dental Findings: In tact   Chest/Lungs:  Chest/Lungs Findings: Clear to auscultation, Normal Respiratory Rate     Heart/Vascular:  Heart Findings: Rate: Normal  Rhythm: Regular Rhythm        Mental Status:  Mental Status Findings:  Cooperative, Alert and Oriented         Anesthesia Plan  Type of Anesthesia, risks & benefits discussed:  Anesthesia Type:  general  Patient's Preference:   Intra-op Monitoring Plan: standard ASA monitors  Intra-op Monitoring Plan Comments:   Post Op Pain Control Plan:   Post Op Pain Control Plan Comments:   Induction:   IV  Beta Blocker:  Patient is not currently on a Beta-Blocker (No further documentation required).       Informed Consent: Patient understands risks and agrees with Anesthesia plan.  Questions answered. Anesthesia consent signed with patient.  ASA Score: 3     Day of Surgery Review of History & Physical: I have interviewed and examined the patient. I have reviewed the patient's H&P dated:    H&P update referred to the provider.         Ready For  Surgery From Anesthesia Perspective.

## 2020-02-07 NOTE — TRANSFER OF CARE
"Anesthesia Transfer of Care Note    Patient: Preston Kirby    Procedure(s) Performed: Procedure(s) (LRB):  COLONOSCOPY (N/A)    Patient location: GI    Anesthesia Type: general    Transport from OR: Transported from OR on room air with adequate spontaneous ventilation    Post pain: adequate analgesia    Post assessment: no apparent anesthetic complications    Post vital signs: stable    Level of consciousness: sedated    Nausea/Vomiting: no nausea/vomiting    Complications: none    Transfer of care protocol was followed      Last vitals:   Visit Vitals  BP (!) 144/98   Pulse 93   Temp 36.7 °C (98.1 °F) (Skin)   Resp 16   Ht 5' 6" (1.676 m)   Wt 81.6 kg (180 lb)   SpO2 98%   BMI 29.05 kg/m²     "

## 2020-02-07 NOTE — PLAN OF CARE
Dr. Vera spoke to pt and pt's wife. Discharge instructions given to pt and pt's wife and both verbalized understanding. IV removed, catheter intact. Ready for discharge.

## 2020-02-07 NOTE — DISCHARGE INSTRUCTIONS
Colonoscopy     A camera attached to a flexible tube with a viewing lens is used to take video pictures.     Colonoscopy is a test to view the inside of your lower digestive tract (colon and rectum). Sometimes it can show the last part of the small intestine (ileum). During the test, small pieces of tissue may be removed for testing. This is called a biopsy. Small growths, such as polyps, may also be removed.   Why is colonoscopy done?  The test is done to help look for colon cancer. And it can help find the source of abdominal pain, bleeding, and changes in bowel habits. It may be needed once a year, depending on factors such as your:  · Age  · Health history  · Family health history  · Symptoms  · Results from any prior colonoscopy  Risks and possible complications  These include:  · Bleeding               · A puncture or tear in the colon   · Risks of anesthesia  · A cancer lesion not being seen  Getting ready   To prepare for the test:  · Talk with your healthcare provider about the risks of the test (see below). Also ask your healthcare provider about alternatives to the test.  · Tell your healthcare provider about any medicines you take. Also tell him or her about any health conditions you may have.  · Make sure your rectum and colon are empty for the test. Follow the diet and bowel prep instructions exactly. If you dont, the test may need to be rescheduled.  · Plan for a friend or family member to drive you home after the test.     Colonoscopy provides an inside view of the entire colon.     You may discuss the results with your doctor right away or at a future visit.  During the test   The test is usually done in the hospital on an outpatient basis. This means you go home the same day. The procedure takes about 30 minutes. During that time:  · You are given relaxing (sedating) medicine through an IV line. You may be drowsy, or fully asleep.  · The healthcare provider will first give you a physical exam to  check for anal and rectal problems.  · Then the anus is lubricated and the scope inserted.  · If you are awake, you may have a feeling similar to needing to have a bowel movement. You may also feel pressure as air is pumped into the colon. Its OK to pass gas during the procedure.  · Biopsy, polyp removal, or other treatments may be done during the test.  After the test   You may have gas right after the test. It can help to try to pass it to help prevent later bloating. Your healthcare provider may discuss the results with you right away. Or you may need to schedule a follow-up visit to talk about the results. After the test, you can go back to your normal eating and other activities. You may be tired from the sedation and need to rest for a few hours.  Date Last Reviewed: 11/1/2016 © 2000-2017 [employer]. [address] [place] [address]. All rights reserved. This information is not intended as a substitute for professional medical care. Always follow your healthcare professional's instructions.    Understanding Colon and Rectal Polyps    The colon (also called the large intestine) is a muscular tube that forms the last part of the digestive tract. It absorbs water and stores food waste. The colon is about 4 to 6 feet long. The rectum is the last 6 inches of the colon. The colon and rectum have a smooth lining composed of millions of cells. Changes in these cells can lead to growths in the colon that can become cancerous and should be removed. Multiple tests are available to screen for colon cancer, but the colonoscopy is the most recommended test. During colonoscopy, these polyps can be removed. How often you need this test depends on many things including your condition, your family history, symptoms, and what the findings were at the previous colonoscopy.   When the colon lining changes  Changes that happen in the cells that line the colon or rectum can lead to growths called polyps.  Over a period of years, polyps can turn cancerous. Removing polyps early may prevent cancer from ever forming.  Polyps  Polyps are fleshy clumps of tissue that form on the lining of the colon or rectum. Small polyps are usually benign (not cancerous). However, over time, cells in a polyp can change and become cancerous. Certain types of polyps known as adenomatous polyps are premalignant. The risk for invasive cancer increases with the size of the polyp and certain cell and gene features. This means that they can become cancerous if they're not removed. Hyperplastic polyps are benign. They can grow quite large and not turn cancerous.   Cancer  Almost all colorectal cancers start when polyp cells begin growing abnormally. As a cancerous tumor grows, it may involve more and more of the colon or rectum. In time, cancer can also grow beyond the colon or rectum and spread to nearby organs or to glands called lymph nodes. The cells can also travel to other parts of the body. This is known as metastasis. The earlier a cancerous tumor is removed, the better the chance of preventing its spread.    Date Last Reviewed: 8/1/2016 © 2000-2017 [employer]. [address] [place] [address]. All rights reserved. This information is not intended as a substitute for professional medical care. Always follow your healthcare professional's instructions.    Hemorrhoids    Hemorrhoids are swollen and inflamed veins inside the rectum and near the anus. The rectum is the last several inches of the colon. The anus is the passage between the rectum and the outside of the body.  Causes  The veins can become swollen due to increased pressure in them. This is most often caused by:  · Chronic constipation or diarrhea  · Straining when having a bowel movement  · Sitting too long on the toilet  · A low-fiber diet  · Pregnancy  Symptoms  · Bleeding from the rectum (this may be noticeable after bowel movements)  · Lump near the  anus  · Itching around the anus  · Pain around the anus  There are different types of hemorrhoids. Depending on the type you have and the severity, you may be able to treat yourself at home. In some cases, a procedure may be the best treatment option. Your healthcare provider can tell you more about this, if needed.  Home care  General care  · To get relief from pain or itching, try:  ¨ Topical products. Your healthcare provider may prescribe or recommend creams, ointments, or pads that can be applied to the hemorrhoid. Use these exactly as directed.  ¨ Medicines. Your healthcare provider may recommend stool softeners, suppositories, or laxatives to help manage constipation. Use these exactly as directed.  ¨ Sitz baths. A sitz bath involves sitting in a few inches of warm bath water. Be careful not to make the water so hot that you burn yourself--test it before sitting in it. Soak for about 10 to 15 minutes a few times a day. This may help relieve pain.  Tips to help prevent hemorrhoids  · Eat more fiber. Fiber adds bulk to stool and absorbs water as it moves through your colon. This makes stool softer and easier to pass.  ¨ Increase the fiber in your diet with more fiber-rich foods. These include fresh fruit, vegetables, and whole grains.  ¨ Take a fiber supplement or bulking agent, if advised to by your provider. These include products such as psyllium or methylcellulose.  · Drink plenty of water, if directed to by your provider. This can help keep stool soft.  · Be more active. Frequent exercise aids digestion and helps prevent constipation. It may also help make bowel movements more regular.  · Dont strain during bowel movements. This can make hemorrhoids more likely. Also, dont sit on the toilet for long periods of time.  Follow-up care  Follow up with your healthcare provider, or as advised. If a culture or imaging tests were done, you will be notified of the results when they are ready. This may take a few  days or longer.  When to seek medical advice  Call your healthcare provider right away if any of these occur:  · Increased bleeding from the rectum  · Increased pain around the rectum or anus  · Weakness or dizziness  Call 911  Call 911 or return to the emergency department right away if any of these occur:  · Trouble breathing or swallowing  · Fainting or loss of consciousness  · Unusually fast heart rate  · Vomiting blood  · Large amounts of blood in stool  Date Last Reviewed: 6/22/2015 © 2000-2017 [employer]. [address] [place] [address]. All rights reserved. This information is not intended as a substitute for professional medical care. Always follow your healthcare professional's instructions.    High-Fiber Diet  Fiber is in fruits, vegetables, cereals, and grains. Fiber passes through your body undigested. A high-fiber diet helps food move through your intestinal tract. The added bulk is helpful in preventing constipation. In people with diverticulosis, fiber helps clean out the pouches along the colon wall. It also prevents new pouches from forming. A high-fiber diet reduces the risk of colon cancer. It also lowers blood cholesterol and prevents high blood sugar in people with diabetes.    The fiber-rich foods listed below should be part of your diet. If you are not used to high-fiber foods, start with 1 or 2 foods from this list. Every 3 to 4 days add a new one to your diet. Do this until you are eating 4 high-fiber foods per day. This should give you 20 to 35 grams of fiber a day. It is also important to drink a lot of water when you are on this diet. You should have 6 to 8 glasses of water a day. Water makes the fiber swell and increases the benefit.  Foods high in dietary fiber  The following foods are high in dietary fiber:  Breads. Breads made with 100% whole-wheat flour; brice, wheat, or rye crackers; whole-grain tortillas, bran muffins.  Cereals. Whole-grain and bran  cereals with bran (shredded wheat, wheat flakes, raisin bran, corn bran); oatmeal, rolled oats, granola, and brown rice.  Fruits. Fresh fruits and their edible skins (pears, prunes, raisins, berries, apples, and apricots); bananas, citrus fruit, mangoes, pineapple; and prune juice.  Nuts. Any nuts and seeds.  Vegetables. Best served raw or lightly cooked. All types, especially: green peas, celery, eggplant, potatoes, spinach, broccoli, Baxter sprouts, winter squash, carrots, cauliflower, soybeans, lentils, and fresh and dried beans of all kinds.  Other. Popcorn, any spices.  Date Last Reviewed: 8/1/2016  © 9921-3119 [employer]. [address] [place] [address]. All rights reserved. This information is not intended as a substitute for professional medical care. Always follow your healthcare professional's instructions.        Diverticulosis    Diverticulosis means that small pouches have formed in the wall of your large intestine (colon). Most often, this problem causes no symptoms and is common as people age. But the pouches in the colon are at risk of becoming infected. When this happens, the condition is called diverticulitis. Although most people with diverticulosis never develop diverticulitis, it is still not uncommon. Rectal bleeding can also occur and in less common situations, a type of colon inflammation called colitis.  While most people do not have symptoms, some people with diverticulosis may have:  Abdominal cramps and pain  Bloating  Constipation  Change in bowel habits  Causes  The exact cause of diverticulosis (and diverticulitis) has not been proved, but a few things are associated with the condition:  Low-fiber diet  Constipation  Lack of exercise  Your healthcare provider will talk with you about how to manage your condition. Diet changes may be all that are needed to help control diverticulosis and prevent progression to diverticulitis. If you develop diverticulitis, you  will likely need other treatments.  Home care  You may be told to take fiber supplements daily. Fiber adds bulk to the stool so that it passes through the colon more easily. Stool softeners may be recommended. You may also be given medications for pain relief. Be sure to take all medications as directed.  In the past, people were told to avoid corn, nuts, and seeds. This is no longer necessary.  Follow these guidelines when caring for yourself at home:  Eat unprocessed foods that are high in fiber. Whole grains, fruits, and vegetables are good choices.  Drink 6 to 8 glasses of water every day unless your healthcare provider has you limit how much fluid you should have.  Watch for changes in your bowel movements. Tell your provider if you notice any changes.  Begin an exercise program. Ask your provider how to get started. Generally, walking is the best.  Get plenty of rest and sleep.  Follow-up care  Follow up with your healthcare provider, or as advised. Regular visits may be needed to check on your health. Sometimes special procedures such as colonoscopy, are needed after an episode of diverticulitis or blooding. Be sure to keep all your appointments.  If a stool sample was taken, or cultures were done, you should be told if they are positive, or if your treatment needs to be changed. You can call as directed for the results.  If X-rays were done, a radiologist will look at them. You will be told if there is a change in your treatment.  If antibiotics were prescribed, be sure to finish them all.  When to seek medical advice  Call your healthcare provider right away if any of these occur:  Fever of 100.4°F (38°C) or higher, or as directed by your healthcare provider  Severe cramps in the lower left side of the abdomen or pain that is getting worse  Tenderness in the lower left side of the abdomen or worsening pain throughout the abdomen  Diarrhea or constipation that doesn't get better within 24 hours  Nausea and  vomiting  Bleeding from the rectum  Call 911  Call emergency services if any of the following occur:  Trouble breathing  Confusion  Very drowsy or trouble awakening  Fainting or loss of consciousness  Rapid heart rate  Chest pain  Date Last Reviewed: 12/30/2015 © 2000-2017 [employer]. [address] [place] [address]. All rights reserved. This information is not intended as a substitute for professional medical care. Always follow your healthcare professional's instructions.

## 2020-02-07 NOTE — H&P
CC: Screening for colorectal cancer - last scope 10 years ago    64 year old male with above. States that symptoms are absent, no alleviating/exacerbating factors. No family history of CA. No personal history of polyps. No bleeding or weight loss.     ROS:  No headache, no fever/chills, no chest pain/SOB, no nausea/vomiting/diarrhea/constipation/GI bleeding/abdominal pain, no dysuria/hematuria.    VSSAF   Exam:   Alert and oriented x 3; no apparent distress   PERRLA, sclera anicteric  CV: Regular rate/rhythm, normal PMI   Lungs: Clear bilaterally with no wheeze/rales   Abdomen: Soft, NT/ND, normal bowel sounds   Ext: No cyanosis, clubbing     Impression:   As above    Plan:   Proceed with endoscopy. Further recs to follow.

## 2020-02-07 NOTE — ANESTHESIA POSTPROCEDURE EVALUATION
Anesthesia Post Evaluation    Patient: Pretson Kirby    Procedure(s) Performed: Procedure(s) (LRB):  COLONOSCOPY (N/A)    Final Anesthesia Type: general    Patient location during evaluation: PACU  Patient participation: Yes- Able to Participate  Level of consciousness: awake and alert and oriented  Post-procedure vital signs: reviewed and stable  Pain management: adequate  Airway patency: patent    PONV status at discharge: No PONV  Anesthetic complications: no      Cardiovascular status: blood pressure returned to baseline and stable  Respiratory status: unassisted and spontaneous ventilation  Hydration status: euvolemic  Follow-up not needed.          Vitals Value Taken Time   /91 2/7/2020  2:05 PM   Temp 36.7 °C (98.1 °F) 2/7/2020 11:25 AM   Pulse 72 2/7/2020  2:10 PM   Resp 18 2/7/2020  2:05 PM   SpO2 100 % 2/7/2020  2:10 PM         Event Time     Out of Recovery 14:33:23          Pain/Jose Francisco Score: Jose Francisco Score: 10 (2/7/2020  2:00 PM)

## 2020-02-10 VITALS
DIASTOLIC BLOOD PRESSURE: 91 MMHG | WEIGHT: 180 LBS | BODY MASS INDEX: 28.93 KG/M2 | HEART RATE: 72 BPM | HEIGHT: 66 IN | SYSTOLIC BLOOD PRESSURE: 142 MMHG | TEMPERATURE: 98 F | OXYGEN SATURATION: 100 % | RESPIRATION RATE: 18 BRPM

## 2020-02-17 ENCOUNTER — TELEPHONE (OUTPATIENT)
Dept: GASTROENTEROLOGY | Facility: CLINIC | Age: 65
End: 2020-02-17

## 2020-02-17 NOTE — TELEPHONE ENCOUNTER
Patient wife notified results are still in process and can take up to 2 weeks as soon as they are resulted and reviewed by Dr. Vera I will send them to you.

## 2020-02-26 ENCOUNTER — TELEPHONE (OUTPATIENT)
Dept: GASTROENTEROLOGY | Facility: CLINIC | Age: 65
End: 2020-02-26

## 2020-02-26 NOTE — TELEPHONE ENCOUNTER
Call placed to Mr. Kirby. I advised him that Dr. Ayala is out of the office until March 2nd, but his biopsy results are in process. I advised him someone will call him once the results are received.

## 2020-02-26 NOTE — TELEPHONE ENCOUNTER
----- Message from Oren Singer sent at 2/26/2020  2:02 PM CST -----  Contact: Wife/Pricila  Type:  Test Results    Who Called:  Wife/Pricila  Name of Test (Lab/Mammo/Etc):  Biopsy/Colonoscopy  Date of Test:  2/7/2020  Ordering Provider:  Lucy  Where the test was performed: [facility]patient/[facility]  Best Call Back Number:  [phone]  Additional Information:  n/a

## 2020-02-27 LAB
FINAL PATHOLOGIC DIAGNOSIS: NORMAL
GROSS: NORMAL

## 2020-04-01 RX ORDER — IRBESARTAN 300 MG/1
TABLET ORAL
Qty: 30 TABLET | Refills: 6 | Status: SHIPPED | OUTPATIENT
Start: 2020-04-01 | End: 2020-04-23 | Stop reason: SDUPTHER

## 2020-04-07 ENCOUNTER — TELEPHONE (OUTPATIENT)
Dept: FAMILY MEDICINE | Facility: CLINIC | Age: 65
End: 2020-04-07

## 2020-04-07 NOTE — TELEPHONE ENCOUNTER
Patient uses mail pharmacy. Please ask patient to call local physical pharmacy if he is willing to have sent until mail order has available?     Thank you!

## 2020-04-07 NOTE — TELEPHONE ENCOUNTER
----- Message from Marbella Jasmin sent at 4/7/2020  3:33 PM CDT -----   please contact pharmacy this and the 150 mg are on back order    irbesartan (AVAPRO) 300 MG tablet 90 tablet 12 2/28/2019 5/13/2022 No  Sig - Route: Take 1 tablet (300 mg total) by mouth every evening. - Oral  Sent to pharmacy as: irbesartan (AVAPRO) 300 MG tablet  Class: Normal        Please contact [facility]  [phone] ref # [id number]

## 2020-04-08 ENCOUNTER — TELEPHONE (OUTPATIENT)
Dept: FAMILY MEDICINE | Facility: CLINIC | Age: 65
End: 2020-04-08

## 2020-04-08 NOTE — TELEPHONE ENCOUNTER
----- Message from Heather Velasco sent at 4/7/2020  4:51 PM CDT -----  Contact: Self :[phone]  Type:  Patient Returning Call    Who Called: Self    Who Left Message for Patient: Dahlia    Does the patient know what this is regarding?:    Would the patient rather a call back or a response via [facility]? Call back    Best Call Back Number:[phone]

## 2020-04-08 NOTE — TELEPHONE ENCOUNTER
Below information given to patient, verbalized   understanding. States he will check his mail order to see where med is and if needed he will call back to clinic

## 2020-04-22 RX ORDER — VALSARTAN 320 MG/1
320 TABLET ORAL DAILY
Qty: 90 TABLET | Refills: 3 | Status: SHIPPED | OUTPATIENT
Start: 2020-04-22 | End: 2020-04-22 | Stop reason: SDUPTHER

## 2020-04-22 RX ORDER — VALSARTAN 320 MG/1
320 TABLET ORAL DAILY
Qty: 90 TABLET | Refills: 3 | Status: SHIPPED | OUTPATIENT
Start: 2020-04-22 | End: 2021-08-05

## 2020-04-22 NOTE — TELEPHONE ENCOUNTER
----- Message from Lorena Fan sent at 4/22/2020  2:52 PM CDT -----  Contact: Pricila Kirby wife   Name of Who is Calling: Pricila Kirby wife     What is the request in detail: Pricila Kirby wife is requesting a call back regards to the pt medication irbesartan (AVAPRO) 300 MG tablet  Please contact to further discuss and advise      Can the clinic reply by [facility]: No     What Number to Call Back if not in [facility]:  [phone]

## 2020-04-22 NOTE — TELEPHONE ENCOUNTER
Reassure them this is a common issue with supply problems with the medications.  We often have to switch people back and forth.    I sent a medication from the same family, valsartan 320 to both his mail order and to the local [employer] on file in case he was running out and so forth.

## 2020-04-22 NOTE — TELEPHONE ENCOUNTER
Spoke to patients wife. Received medications from [facility] but the Avapro 300mg was not filled. She called them and they stated that the Avapro was on back order and told her to get you to give her  something else.

## 2020-04-23 RX ORDER — IRBESARTAN 300 MG/1
300 TABLET ORAL NIGHTLY
Qty: 30 TABLET | Refills: 6 | Status: SHIPPED | OUTPATIENT
Start: 2020-04-23 | End: 2021-02-03 | Stop reason: SDUPTHER

## 2020-04-23 NOTE — TELEPHONE ENCOUNTER
----- Message from Rosalina Miller sent at 4/23/2020 12:32 PM CDT -----  Contact: Pricila (wife)  Please refill the medication(s) listed below :Calling states staff is needing to sent medication to [facility] and cancel it at [facility]    Medication # 1 :irbesartan (AVAPRO) 300 MG tablet    Please call the patient when the prescription is sent to pharmacy :[phone]    Can the clinic reply in [facility] :No    [id number] [place] [place] [facility] [address] [facility] [address] [phone] (Phone)  [phone] (Fax)       not examined

## 2020-04-23 NOTE — TELEPHONE ENCOUNTER
Patient wife states [facility] can not get this med, but she has spoken with radhika and they do have med

## 2020-07-01 ENCOUNTER — LAB VISIT (OUTPATIENT)
Dept: LAB | Facility: HOSPITAL | Age: 65
End: 2020-07-01
Attending: INTERNAL MEDICINE
Payer: COMMERCIAL

## 2020-07-01 DIAGNOSIS — Z11.59 NEED FOR HEPATITIS C SCREENING TEST: ICD-10-CM

## 2020-07-01 DIAGNOSIS — E78.5 HYPERLIPIDEMIA, UNSPECIFIED HYPERLIPIDEMIA TYPE: ICD-10-CM

## 2020-07-01 DIAGNOSIS — E78.6 HDL LIPOPROTEIN DEFICIENCY: ICD-10-CM

## 2020-07-01 DIAGNOSIS — E11.65 UNCONTROLLED TYPE 2 DIABETES MELLITUS WITH HYPERGLYCEMIA: ICD-10-CM

## 2020-07-01 LAB
ALBUMIN SERPL BCP-MCNC: 4.2 G/DL (ref 3.5–5.2)
ALP SERPL-CCNC: 75 U/L (ref 55–135)
ALT SERPL W/O P-5'-P-CCNC: 18 U/L (ref 10–44)
ANION GAP SERPL CALC-SCNC: 10 MMOL/L (ref 8–16)
AST SERPL-CCNC: 22 U/L (ref 10–40)
BILIRUB SERPL-MCNC: 0.4 MG/DL (ref 0.1–1)
BUN SERPL-MCNC: 11 MG/DL (ref 8–23)
CALCIUM SERPL-MCNC: 9.3 MG/DL (ref 8.7–10.5)
CHLORIDE SERPL-SCNC: 108 MMOL/L (ref 95–110)
CHOLEST SERPL-MCNC: 189 MG/DL (ref 120–199)
CHOLEST/HDLC SERPL: 3.4 {RATIO} (ref 2–5)
CO2 SERPL-SCNC: 24 MMOL/L (ref 23–29)
CREAT SERPL-MCNC: 0.9 MG/DL (ref 0.5–1.4)
EST. GFR  (AFRICAN AMERICAN): >60 ML/MIN/1.73 M^2
EST. GFR  (NON AFRICAN AMERICAN): >60 ML/MIN/1.73 M^2
ESTIMATED AVG GLUCOSE: 120 MG/DL (ref 68–131)
GLUCOSE SERPL-MCNC: 89 MG/DL (ref 70–110)
HBA1C MFR BLD HPLC: 5.8 % (ref 4–5.6)
HDLC SERPL-MCNC: 56 MG/DL (ref 40–75)
HDLC SERPL: 29.6 % (ref 20–50)
LDLC SERPL CALC-MCNC: 114.4 MG/DL (ref 63–159)
NONHDLC SERPL-MCNC: 133 MG/DL
POTASSIUM SERPL-SCNC: 4.8 MMOL/L (ref 3.5–5.1)
PROT SERPL-MCNC: 7.4 G/DL (ref 6–8.4)
SODIUM SERPL-SCNC: 142 MMOL/L (ref 136–145)
TRIGL SERPL-MCNC: 93 MG/DL (ref 30–150)
URATE SERPL-MCNC: 6.3 MG/DL (ref 3.4–7)

## 2020-07-01 PROCEDURE — 80053 COMPREHEN METABOLIC PANEL: CPT

## 2020-07-01 PROCEDURE — 80061 LIPID PANEL: CPT

## 2020-07-01 PROCEDURE — 86803 HEPATITIS C AB TEST: CPT

## 2020-07-01 PROCEDURE — 83036 HEMOGLOBIN GLYCOSYLATED A1C: CPT

## 2020-07-01 PROCEDURE — 84550 ASSAY OF BLOOD/URIC ACID: CPT

## 2020-07-01 PROCEDURE — 36415 COLL VENOUS BLD VENIPUNCTURE: CPT | Mod: PO

## 2020-07-02 LAB — HCV AB SERPL QL IA: NEGATIVE

## 2020-11-10 RX ORDER — AMLODIPINE BESYLATE 5 MG/1
TABLET ORAL
Qty: 90 TABLET | Refills: 3 | Status: SHIPPED | OUTPATIENT
Start: 2020-11-10 | End: 2021-02-03 | Stop reason: SDUPTHER

## 2020-11-10 RX ORDER — PANTOPRAZOLE SODIUM 40 MG/1
40 TABLET, DELAYED RELEASE ORAL
Qty: 90 TABLET | Refills: 12 | Status: SHIPPED | OUTPATIENT
Start: 2020-11-10 | End: 2021-05-13 | Stop reason: SDUPTHER

## 2020-11-10 RX ORDER — MONTELUKAST SODIUM 10 MG/1
10 TABLET ORAL DAILY
Qty: 90 TABLET | Refills: 12 | Status: SHIPPED | OUTPATIENT
Start: 2020-11-10 | End: 2021-02-03 | Stop reason: SDUPTHER

## 2020-11-24 ENCOUNTER — HOSPITAL ENCOUNTER (EMERGENCY)
Facility: HOSPITAL | Age: 65
Discharge: HOME | End: 2020-11-25
Attending: EMERGENCY MEDICINE
Payer: MEDICARE

## 2020-11-24 DIAGNOSIS — U07.1 COVID-19 VIRUS INFECTION: ICD-10-CM

## 2020-11-24 DIAGNOSIS — J12.82 PNEUMONIA DUE TO COVID-19 VIRUS: Primary | ICD-10-CM

## 2020-11-24 DIAGNOSIS — U07.1 PNEUMONIA DUE TO COVID-19 VIRUS: Primary | ICD-10-CM

## 2020-11-24 DIAGNOSIS — R53.1 WEAKNESS: ICD-10-CM

## 2020-11-24 LAB
ALBUMIN SERPL BCP-MCNC: 2.9 G/DL (ref 3.5–5.2)
ALP SERPL-CCNC: 50 U/L (ref 55–135)
ALT SERPL W/O P-5'-P-CCNC: 58 U/L (ref 10–44)
ANION GAP SERPL CALC-SCNC: 11 MMOL/L (ref 8–16)
AST SERPL-CCNC: 69 U/L (ref 10–40)
BASOPHILS # BLD AUTO: 0.02 K/UL (ref 0–0.2)
BASOPHILS NFR BLD: 0.3 % (ref 0–1.9)
BILIRUB SERPL-MCNC: 0.6 MG/DL (ref 0.1–1)
BNP SERPL-MCNC: 19 PG/ML (ref 0–99)
BUN SERPL-MCNC: 19 MG/DL (ref 8–23)
CALCIUM SERPL-MCNC: 8.8 MG/DL (ref 8.7–10.5)
CHLORIDE SERPL-SCNC: 99 MMOL/L (ref 95–110)
CO2 SERPL-SCNC: 23 MMOL/L (ref 23–29)
CREAT SERPL-MCNC: 1.1 MG/DL (ref 0.5–1.4)
DIFFERENTIAL METHOD: ABNORMAL
EOSINOPHIL # BLD AUTO: 0 K/UL (ref 0–0.5)
EOSINOPHIL NFR BLD: 0.1 % (ref 0–8)
ERYTHROCYTE [DISTWIDTH] IN BLOOD BY AUTOMATED COUNT: 13.2 % (ref 11.5–14.5)
EST. GFR  (AFRICAN AMERICAN): >60 ML/MIN/1.73 M^2
EST. GFR  (NON AFRICAN AMERICAN): >60 ML/MIN/1.73 M^2
GLUCOSE SERPL-MCNC: 134 MG/DL (ref 70–110)
HCT VFR BLD AUTO: 41.1 % (ref 40–54)
HGB BLD-MCNC: 14 G/DL (ref 14–18)
IMM GRANULOCYTES # BLD AUTO: 0.02 K/UL (ref 0–0.04)
IMM GRANULOCYTES NFR BLD AUTO: 0.3 % (ref 0–0.5)
INFLUENZA A, MOLECULAR: NEGATIVE
INFLUENZA B, MOLECULAR: NEGATIVE
LYMPHOCYTES # BLD AUTO: 0.9 K/UL (ref 1–4.8)
LYMPHOCYTES NFR BLD: 12.9 % (ref 18–48)
MCH RBC QN AUTO: 29.3 PG (ref 27–31)
MCHC RBC AUTO-ENTMCNC: 34.1 G/DL (ref 32–36)
MCV RBC AUTO: 86 FL (ref 82–98)
MONOCYTES # BLD AUTO: 0.7 K/UL (ref 0.3–1)
MONOCYTES NFR BLD: 10.2 % (ref 4–15)
NEUTROPHILS # BLD AUTO: 5.1 K/UL (ref 1.8–7.7)
NEUTROPHILS NFR BLD: 76.2 % (ref 38–73)
NRBC BLD-RTO: 0 /100 WBC
PLATELET # BLD AUTO: 216 K/UL (ref 150–350)
PMV BLD AUTO: 11.2 FL (ref 9.2–12.9)
POTASSIUM SERPL-SCNC: 5 MMOL/L (ref 3.5–5.1)
PROT SERPL-MCNC: 6.7 G/DL (ref 6–8.4)
RBC # BLD AUTO: 4.78 M/UL (ref 4.6–6.2)
SARS-COV-2 RDRP RESP QL NAA+PROBE: NEGATIVE
SODIUM SERPL-SCNC: 133 MMOL/L (ref 136–145)
SPECIMEN SOURCE: NORMAL
WBC # BLD AUTO: 6.74 K/UL (ref 3.9–12.7)

## 2020-11-24 PROCEDURE — 80053 COMPREHEN METABOLIC PANEL: CPT | Mod: HCNC

## 2020-11-24 PROCEDURE — 96374 THER/PROPH/DIAG INJ IV PUSH: CPT | Mod: HCNC

## 2020-11-24 PROCEDURE — 99285 EMERGENCY DEPT VISIT HI MDM: CPT | Mod: 25,HCNC

## 2020-11-24 PROCEDURE — 96361 HYDRATE IV INFUSION ADD-ON: CPT | Mod: HCNC

## 2020-11-24 PROCEDURE — 85025 COMPLETE CBC W/AUTO DIFF WBC: CPT | Mod: HCNC

## 2020-11-24 PROCEDURE — 93010 EKG 12-LEAD: ICD-10-PCS | Mod: HCNC,,, | Performed by: SPECIALIST

## 2020-11-24 PROCEDURE — U0003 INFECTIOUS AGENT DETECTION BY NUCLEIC ACID (DNA OR RNA); SEVERE ACUTE RESPIRATORY SYNDROME CORONAVIRUS 2 (SARS-COV-2) (CORONAVIRUS DISEASE [COVID-19]), AMPLIFIED PROBE TECHNIQUE, MAKING USE OF HIGH THROUGHPUT TECHNOLOGIES AS DESCRIBED BY CMS-2020-01-R: HCPCS | Mod: HCNC

## 2020-11-24 PROCEDURE — 93005 ELECTROCARDIOGRAM TRACING: CPT | Mod: HCNC

## 2020-11-24 PROCEDURE — U0002 COVID-19 LAB TEST NON-CDC: HCPCS | Mod: HCNC

## 2020-11-24 PROCEDURE — 87502 INFLUENZA DNA AMP PROBE: CPT | Mod: HCNC

## 2020-11-24 PROCEDURE — 25000003 PHARM REV CODE 250: Mod: HCNC | Performed by: EMERGENCY MEDICINE

## 2020-11-24 PROCEDURE — 83880 ASSAY OF NATRIURETIC PEPTIDE: CPT | Mod: HCNC

## 2020-11-24 PROCEDURE — 63600175 PHARM REV CODE 636 W HCPCS: Mod: HCNC | Performed by: EMERGENCY MEDICINE

## 2020-11-24 PROCEDURE — 93010 ELECTROCARDIOGRAM REPORT: CPT | Mod: HCNC,,, | Performed by: SPECIALIST

## 2020-11-24 RX ORDER — ONDANSETRON 2 MG/ML
4 INJECTION INTRAMUSCULAR; INTRAVENOUS
Status: COMPLETED | OUTPATIENT
Start: 2020-11-24 | End: 2020-11-24

## 2020-11-24 RX ADMIN — SODIUM CHLORIDE 1000 ML: 0.9 INJECTION, SOLUTION INTRAVENOUS at 10:11

## 2020-11-24 RX ADMIN — ONDANSETRON 4 MG: 2 INJECTION INTRAMUSCULAR; INTRAVENOUS at 10:11

## 2020-11-25 VITALS
HEIGHT: 66 IN | OXYGEN SATURATION: 95 % | TEMPERATURE: 98 F | WEIGHT: 162 LBS | SYSTOLIC BLOOD PRESSURE: 105 MMHG | RESPIRATION RATE: 20 BRPM | HEART RATE: 72 BPM | DIASTOLIC BLOOD PRESSURE: 67 MMHG | BODY MASS INDEX: 26.03 KG/M2

## 2020-11-25 LAB — SARS-COV-2 RNA RESP QL NAA+PROBE: DETECTED

## 2020-11-25 RX ORDER — ALBUTEROL SULFATE 90 UG/1
1-2 AEROSOL, METERED RESPIRATORY (INHALATION) EVERY 6 HOURS PRN
Qty: 18 G | Refills: 0 | Status: SHIPPED | OUTPATIENT
Start: 2020-11-25 | End: 2021-08-05 | Stop reason: SDUPTHER

## 2020-11-25 RX ORDER — ONDANSETRON 4 MG/1
4 TABLET, ORALLY DISINTEGRATING ORAL EVERY 8 HOURS PRN
Qty: 12 TABLET | Refills: 0 | Status: SHIPPED | OUTPATIENT
Start: 2020-11-25 | End: 2021-08-05

## 2020-11-25 RX ORDER — BENZONATATE 100 MG/1
100 CAPSULE ORAL 3 TIMES DAILY PRN
Qty: 30 CAPSULE | Refills: 0 | Status: SHIPPED | OUTPATIENT
Start: 2020-11-25 | End: 2021-08-05 | Stop reason: SDUPTHER

## 2020-11-25 NOTE — ED NOTES
Preston Kirby presents to the ED with c/o SOB that started one week ago. Patient reports that his wife had similar symptoms and he was tested for covid but has not received his results yet. Patient states that he feels worse since taking his z daniele. Associated complaints are vomiting, diarrhea and weakness. Mucous membranes are pink and moist. Skin is warm, dry and intact. Lungs are clear bilaterally, respirations are regular and unlabored. Denies cough, congestion or rhinorrhea. BS active x4, no tenderness with palpation, abd is soft and not distended. Denies any appetite or activity change. S1S2, capillary refill is < 2 seconds. Denies dysuria, difficulty urinating, frequency, numbness, tingling or weakness. ALEXIS BLANCAS

## 2020-11-25 NOTE — ED PROVIDER NOTES
Encounter Date: 11/24/2020       History     Chief Complaint   Patient presents with    Shortness of Breath     x 1 week, took a zpac with no relief.    Diarrhea    Weakness     Patient is a 65-year-old male with a past medical history of hypertension hyperlipidemia diabetes who presents the emergency room for evaluation of nasal congestion cough shortness of breath fevers nausea vomiting diarrhea that has been present for the past 10-11 days and progressively getting worse.  The patient went to urgent care approximately 7 days ago and was started on a Z-Toni.  He was not check for COVID.  He was discharged and feels worse.  His wife is sick with similar symptoms.  He states he has lost 7 lb in the past 3 days.        Review of patient's allergies indicates:   Allergen Reactions    Versed [midazolam] Other (See Comments)     Pt does not know what he is doing, [place]     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    HTN (hypertension) 10/31/2012    Hyperlipidemia 10/31/2012    Kidney stones     Obesity     Sleep apnea 10/31/2012    No cpap    Sleep apnea     USES CPAP    Vitamin D deficiency disease 11/20/2013    Wears glasses      Past Surgical History:   Procedure Laterality Date    BONE SPUR      LEFT LITTLE TOE    Bone spur excision Left     Great toe     COLONOSCOPY N/A 2/7/2020    Procedure: COLONOSCOPY;  Surgeon: Ronal Vera MD;  Location: [facility];  Service: Endoscopy;  Laterality: N/A;    LUMBAR EPIDURAL INJECTION      TOE AMPUTATION      RIGHT LITTLE TOE    TONSILLECTOMY       History reviewed. No pertinent family history.  Social History     Tobacco Use    Smoking status: Never Smoker    Smokeless tobacco: Never Used    Substance Use Topics    Alcohol use: Yes     Frequency: 2-4 times a month     Drinks per session: 3 or 4     Binge frequency: Less than monthly     Comment: occ    Drug use: No     Review of Systems   Constitutional: Positive for appetite change, chills, fatigue, fever and unexpected weight change.   HENT: Negative for ear pain, rhinorrhea and sore throat.    Eyes: Negative for pain and visual disturbance.   Respiratory: Positive for cough and shortness of breath.    Cardiovascular: Negative for chest pain.   Gastrointestinal: Positive for diarrhea, nausea and vomiting. Negative for abdominal pain.   Genitourinary: Negative for difficulty urinating, dysuria and flank pain.   Musculoskeletal: Negative for arthralgias, back pain, myalgias and neck pain.   Skin: Negative for rash.   Neurological: Negative for weakness, numbness and headaches.   All other systems reviewed and are negative.      Physical Exam     Initial Vitals [11/24/20 2024]   BP Pulse Resp Temp SpO2   108/62 84 20 98.2 °F (36.8 °C) (!) 94 %      MAP       --         Physical Exam    Nursing note and vitals reviewed.  Constitutional: He appears well-developed and well-nourished.   Appears weak and subtly fatigued   HENT:   Head: Normocephalic and atraumatic.   Mouth/Throat: Oropharynx is clear and moist.   Eyes: Conjunctivae and EOM are normal. Pupils are equal, round, and reactive to light.   Neck: Normal range of motion. Neck supple.   Cardiovascular: Normal rate, regular rhythm, normal heart sounds and intact distal pulses. Exam reveals no gallop and no friction rub.    No murmur heard.  Pulmonary/Chest: He has no wheezes. He has no rhonchi. He has rales.   Abdominal: Soft. Bowel sounds are normal. There is no abdominal tenderness.   Musculoskeletal: Normal range of motion. No edema.   Neurological: He is alert and oriented to person, place, and time. He has normal strength. No sensory deficit. GCS score is 15. GCS eye subscore is 4. GCS verbal  subscore is 5. GCS motor subscore is 6.   Skin: Skin is warm and dry. Capillary refill takes less than 2 seconds.   Psychiatric: He has a normal mood and affect.         ED Course   Procedures  Labs Reviewed   INFLUENZA A & B BY MOLECULAR   SARS-COV-2 RNA AMPLIFICATION, QUAL   CBC W/ AUTO DIFFERENTIAL   COMPREHENSIVE METABOLIC PANEL          Imaging Results          X-Ray Chest AP Portable (In process)                  Medical Decision Making:   Patient has coronavirus.  He has an early bilateral pneumonitis but no significant hypoxia.  He is 93-96% here.  Labs do not show significant dehydration.  He was given fluids and feels better.  He would like to go home.  I will prescribe him albuterol Zofran Tessalon in place him in the Coronavirus home referral program                   ED Course as of Nov 25 0035 Tue Nov 24, 2020 2150 Patient has a negative rapid COVID but his chest x-ray is concerning for coronavirus and I suspect he actually does have coronavirus given his symptoms.  This is likely a false negative.  Awaiting labs.    [JS]   2254 EKG independently interpreted by me as rate 75 right bundle-branch block normal axis no ST segment elevation or depression.    [JS]   2255 Patient has an old EKG in 2015 that shows right bundle block at that time.  He has been given IV fluids.  I suspect he has COVID given mild lymphopenia.    [JS]   2256 Patient appears to have a bilateral pneumonitis with hypoxia.    [JS]      ED Course User Index  [JS] Braeden Charles MD            Clinical Impression:       ICD-10-CM ICD-9-CM   1. Pneumonia due to COVID-19 virus  U07.1 480.8    J12.89    2. Weakness  R53.1 780.79   3. COVID-19 virus infection  U07.1 079.89                                               Braeden Charles MD  11/25/20 0036

## 2020-11-25 NOTE — ED NOTES
Upon discharge, patient is AAOx4, no cardiac or respiratory complications. Follow up care and  Medications have been reviewed with patient and has been instructed to return to the ER if needed. Patient verbalized understanding and ambulated to the lobby without difficulty. MAGALIS ESPINOZA.

## 2020-11-25 NOTE — DISCHARGE INSTRUCTIONS
Return to the ER if your home oxygen monitor is reading less than 90%, you are feeling worse, not able to eat or drink.  The coronavirus test performed in the ER today was a rapid test was negative however I do not believe that is the case.  We performed another routine test on you that is pending.

## 2020-11-25 NOTE — ED NOTES
Patient ambulated down price. Patient maintained sats of 95-96%. Patient's saturation was 93% when placed back on monitor in room. Dr. Charles is aware.

## 2020-11-25 NOTE — FIRST PROVIDER EVALUATION
Emergency Department TeleTriage Encounter Note      CHIEF COMPLAINT    Chief Complaint   Patient presents with    Shortness of Breath     x 1 week, took a zpac with no relief.    Diarrhea    Weakness       VITAL SIGNS   Initial Vitals [11/24/20 2024]   BP Pulse Resp Temp SpO2   108/62 84 20 98.2 °F (36.8 °C) (!) 94 %      MAP       --            ALLERGIES    Review of patient's allergies indicates:   Allergen Reactions    Versed [midazolam] Other (See Comments)     Pt does not know what he is doing, [place]       PROVIDER TRIAGE NOTE  64 y/o male which present with weakness, SOB and diarrhea for the past week. He went to his PCP a week ago and diagnosed with a sinus infection and given a Z-daniele.       ORDERS  Labs Reviewed - No data to display    ED Orders (720h ago, onward)    Start Ordered     Status Ordering Provider    11/24/20 2050 11/24/20 2049  Saline lock IV  Once      Ordered TRUDI LORENZO    11/24/20 2050 11/24/20 2049  Pulse Oximetry Continuous  Continuous      Ordered TRUDI LORENZO    11/24/20 2050 11/24/20 2049  Cardiac Monitoring - Adult  Continuous     Comments: Notify Physician If:    Ordered TRUDI LORENZO    11/24/20 2050 11/24/20 2049  EKG 12-lead  Once      Ordered TRUDI LORENZO    11/24/20 2050 11/24/20 2049  CBC auto differential  STAT      Ordered TRUID LORENZO    11/24/20 2050 11/24/20 2049  Comprehensive metabolic panel  STAT      Ordered TRUDI LORENZO    11/24/20 2050 11/24/20 2049  X-Ray Chest AP Portable  1 time imaging      Ordered TRUDI LORENZO    11/24/20 2049 11/24/20 2049  POCT COVID-19 Rapid Screening  Once      Ordered TRUDI LORENZO    11/24/20 2049 11/24/20 2049  POCT Influenza A/B Molecular  Once      Ordered BJTRUDI STEPHENSON            Virtual Visit Note: The provider triage portion of this emergency department evaluation and documentation was performed via [employer], a HIPAA-compliant  telemedicine application, in concert with a tele-presenter in the room. A face to face patient evaluation with one of my colleagues will occur once the patient is placed in an emergency department room.      DISCLAIMER: This note was prepared with 365 [employer] voice recognition transcription software. Garbled syntax, mangled pronouns, and other bizarre constructions may be attributed to that software system.

## 2020-11-26 ENCOUNTER — NURSE TRIAGE (OUTPATIENT)
Dept: ADMINISTRATIVE | Facility: CLINIC | Age: 65
End: 2020-11-26

## 2020-11-26 NOTE — TELEPHONE ENCOUNTER
1st attempt to contact pt for enrollment in Covid Surveillance program. No answer x2. Unable to leave voicemail due to no mailbox set up yet. Sent [employer] message. Will attempt outreach again later.    Reason for Disposition   Caller has cancelled the call before the first contact    Protocols used: NO CONTACT OR DUPLICATE CONTACT CALL-A-AH

## 2020-11-27 ENCOUNTER — NURSE TRIAGE (OUTPATIENT)
Dept: ADMINISTRATIVE | Facility: CLINIC | Age: 65
End: 2020-11-27

## 2020-11-27 NOTE — TELEPHONE ENCOUNTER
You were recently enrolled in the COVID-19 Home Surveillance Program. This is a home monitoring program to help you with your COVID-19 symptoms while at home.    This program provides you with a pulse oximeter device and you will be asked to enter your vital signs (heart rate, oxygen saturation, temperature) and symptoms twice a day through the [facility] MyChart Smartphone application. Certain abnormal symptoms and vital signs will alert an on-call nurse team, who will call you to triage your symptoms. Additionally, if you do not enter a response, the nurse call team will call to check in on you. If you decide to enroll in this program, you can opt out anytime if you feel that you no longer need the services provided.    You will be called notified by the pharmacy when your pulse oximeter is ready for pick-up. If you have not been contacted by the pharmacy, please notify the [facility] COVID-19 Surveillance representative during your enrollment call and they will be able to assist you further.     You can access the program consent and additional information through the MyChart [facility] portal Smartphone application. If you have any difficulty installing the application, please call [phone] Monday through Friday from 9 AM to 5 PM or email [email or web address] at any time.     If you are having a medical emergency, call 911 or go to your nearest emergency room.

## 2020-11-28 ENCOUNTER — NURSE TRIAGE (OUTPATIENT)
Dept: ADMINISTRATIVE | Facility: CLINIC | Age: 65
End: 2020-11-28

## 2020-11-28 NOTE — TELEPHONE ENCOUNTER
Called pt for enrollment in Covid Surveillance program. Pt verified name and . Pt declined to participate in surveillance. Offered text home symptom monitoring and pt agreed. Removed surveillance tasks and placed order for text program. Pt has pulse ox and when initially checking it today it was 93 but after a few deep breaths it went up to 98. Pt initially said he was having severe difficulty breathing but he is not gasping for air, just having SOB. Recommended pt go to ED for evaluation and he states he was told by physician at ED if his O2 sat is above 90 he is okay. Advised pt to start doing deep breathing exercises and encouraged activity to help with SOB. Advised if SpO2 falls below 94 and does not increase after a few deep breaths to return to ED for evaluation. Gave number to OOC for worsening symptoms or further concerns.    Reason for Disposition   MODERATE difficulty breathing (e.g., speaks in phrases, SOB even at rest, pulse 100-120)    Additional Information   Negative: Severe difficulty breathing (e.g., struggling for each breath, speaks in single words)   Negative: Difficult to awaken or acting confused (e.g., disoriented, slurred speech)   Negative: Bluish (or gray) lips or face now   Negative: Shock suspected (e.g., cold/pale/clammy skin, too weak to stand, low BP, rapid pulse)   Negative: Sounds like a life-threatening emergency to the triager   Negative: [1] COVID-19 suspected (e.g., cough, fever, shortness of breath) AND [2] mild symptoms AND [3] public health department recommends testing   Negative: [1] COVID-19 exposure AND [2] no symptoms   Negative: COVID-19 and Breastfeeding, questions about   Negative: SEVERE or constant chest pain (Exception: mild central chest pain, present only when coughing)    Protocols used: CORONAVIRUS (COVID-19) - DIAGNOSED OR [id number]

## 2020-12-07 ENCOUNTER — PATIENT OUTREACH (OUTPATIENT)
Dept: ADMINISTRATIVE | Facility: HOSPITAL | Age: 65
End: 2020-12-07

## 2020-12-11 ENCOUNTER — PATIENT MESSAGE (OUTPATIENT)
Dept: OTHER | Facility: OTHER | Age: 65
End: 2020-12-11

## 2020-12-30 ENCOUNTER — TELEPHONE (OUTPATIENT)
Dept: FAMILY MEDICINE | Facility: CLINIC | Age: 65
End: 2020-12-30

## 2020-12-30 NOTE — TELEPHONE ENCOUNTER
----- Message from Michell Foreman sent at 12/30/2020 10:23 AM CST -----  Regarding: Labs  Name of Who is Calling:GERTRUDIS ZIMMERMAN [[id number]]       What is the request in detail:Patient is requesting a call back he would like to have his orders for bloodworm scheduled  01/08 or 01/09/21 at the [place] location       Can the clinic reply by [facility]: no      What Number to Call Back if not in [facility]: [phone]

## 2021-01-11 ENCOUNTER — OFFICE VISIT (OUTPATIENT)
Dept: FAMILY MEDICINE | Facility: CLINIC | Age: 66
End: 2021-01-11
Payer: MEDICARE

## 2021-01-11 ENCOUNTER — LAB VISIT (OUTPATIENT)
Dept: LAB | Facility: HOSPITAL | Age: 66
End: 2021-01-11
Attending: INTERNAL MEDICINE
Payer: MEDICARE

## 2021-01-11 VITALS
DIASTOLIC BLOOD PRESSURE: 60 MMHG | BODY MASS INDEX: 27.81 KG/M2 | HEIGHT: 65 IN | SYSTOLIC BLOOD PRESSURE: 124 MMHG | TEMPERATURE: 98 F | HEART RATE: 68 BPM | WEIGHT: 166.88 LBS | OXYGEN SATURATION: 99 %

## 2021-01-11 DIAGNOSIS — E78.6 HDL LIPOPROTEIN DEFICIENCY: ICD-10-CM

## 2021-01-11 DIAGNOSIS — E55.9 VITAMIN D DEFICIENCY DISEASE: ICD-10-CM

## 2021-01-11 DIAGNOSIS — Z23 NEEDS FLU SHOT: ICD-10-CM

## 2021-01-11 DIAGNOSIS — E11.65 UNCONTROLLED TYPE 2 DIABETES MELLITUS WITH HYPERGLYCEMIA: ICD-10-CM

## 2021-01-11 DIAGNOSIS — M1A.9XX0 CHRONIC GOUT WITHOUT TOPHUS, UNSPECIFIED CAUSE, UNSPECIFIED SITE: ICD-10-CM

## 2021-01-11 DIAGNOSIS — Z86.010 HISTORY OF COLONIC POLYPS: ICD-10-CM

## 2021-01-11 DIAGNOSIS — M47.812 CERVICAL SPONDYLOSIS: ICD-10-CM

## 2021-01-11 DIAGNOSIS — N40.0 BENIGN PROSTATIC HYPERPLASIA, UNSPECIFIED WHETHER LOWER URINARY TRACT SYMPTOMS PRESENT: ICD-10-CM

## 2021-01-11 DIAGNOSIS — I10 ESSENTIAL HYPERTENSION: ICD-10-CM

## 2021-01-11 DIAGNOSIS — E78.5 HYPERLIPIDEMIA, UNSPECIFIED HYPERLIPIDEMIA TYPE: ICD-10-CM

## 2021-01-11 DIAGNOSIS — Z12.5 SCREENING FOR PROSTATE CANCER: ICD-10-CM

## 2021-01-11 DIAGNOSIS — M54.12 CERVICAL RADICULOPATHY: ICD-10-CM

## 2021-01-11 DIAGNOSIS — Z00.00 ROUTINE MEDICAL EXAM: ICD-10-CM

## 2021-01-11 DIAGNOSIS — Z00.00 ROUTINE MEDICAL EXAM: Primary | ICD-10-CM

## 2021-01-11 DIAGNOSIS — M48.061 BILATERAL STENOSIS OF LATERAL RECESS OF LUMBAR SPINE: ICD-10-CM

## 2021-01-11 DIAGNOSIS — Z23 NEED FOR VACCINATION AGAINST STREPTOCOCCUS PNEUMONIAE USING PNEUMOCOCCAL CONJUGATE VACCINE 13: ICD-10-CM

## 2021-01-11 DIAGNOSIS — R79.89 ABNORMAL LIVER FUNCTION TESTS: ICD-10-CM

## 2021-01-11 LAB
25(OH)D3+25(OH)D2 SERPL-MCNC: 45 NG/ML (ref 30–96)
ALBUMIN SERPL BCP-MCNC: 4.2 G/DL (ref 3.5–5.2)
ALP SERPL-CCNC: 72 U/L (ref 55–135)
ALT SERPL W/O P-5'-P-CCNC: 13 U/L (ref 10–44)
ANION GAP SERPL CALC-SCNC: 11 MMOL/L (ref 8–16)
AST SERPL-CCNC: 19 U/L (ref 10–40)
BASOPHILS # BLD AUTO: 0.07 K/UL (ref 0–0.2)
BASOPHILS NFR BLD: 0.9 % (ref 0–1.9)
BILIRUB SERPL-MCNC: 0.8 MG/DL (ref 0.1–1)
BUN SERPL-MCNC: 16 MG/DL (ref 8–23)
CALCIUM SERPL-MCNC: 9.3 MG/DL (ref 8.7–10.5)
CHLORIDE SERPL-SCNC: 103 MMOL/L (ref 95–110)
CHOLEST SERPL-MCNC: 205 MG/DL (ref 120–199)
CHOLEST/HDLC SERPL: 4 {RATIO} (ref 2–5)
CO2 SERPL-SCNC: 26 MMOL/L (ref 23–29)
COMPLEXED PSA SERPL-MCNC: 1.2 NG/ML (ref 0–4)
CREAT SERPL-MCNC: 0.9 MG/DL (ref 0.5–1.4)
DIFFERENTIAL METHOD: ABNORMAL
EOSINOPHIL # BLD AUTO: 0.4 K/UL (ref 0–0.5)
EOSINOPHIL NFR BLD: 5.7 % (ref 0–8)
ERYTHROCYTE [DISTWIDTH] IN BLOOD BY AUTOMATED COUNT: 15.2 % (ref 11.5–14.5)
EST. GFR  (AFRICAN AMERICAN): >60 ML/MIN/1.73 M^2
EST. GFR  (NON AFRICAN AMERICAN): >60 ML/MIN/1.73 M^2
ESTIMATED AVG GLUCOSE: 114 MG/DL (ref 68–131)
GLUCOSE SERPL-MCNC: 100 MG/DL (ref 70–110)
HBA1C MFR BLD HPLC: 5.6 % (ref 4–5.6)
HCT VFR BLD AUTO: 47.6 % (ref 40–54)
HDLC SERPL-MCNC: 51 MG/DL (ref 40–75)
HDLC SERPL: 24.9 % (ref 20–50)
HGB BLD-MCNC: 14.7 G/DL (ref 14–18)
IMM GRANULOCYTES # BLD AUTO: 0.01 K/UL (ref 0–0.04)
IMM GRANULOCYTES NFR BLD AUTO: 0.1 % (ref 0–0.5)
LDLC SERPL CALC-MCNC: 136.8 MG/DL (ref 63–159)
LYMPHOCYTES # BLD AUTO: 2.4 K/UL (ref 1–4.8)
LYMPHOCYTES NFR BLD: 32.7 % (ref 18–48)
MCH RBC QN AUTO: 28.4 PG (ref 27–31)
MCHC RBC AUTO-ENTMCNC: 30.9 G/DL (ref 32–36)
MCV RBC AUTO: 92 FL (ref 82–98)
MONOCYTES # BLD AUTO: 0.6 K/UL (ref 0.3–1)
MONOCYTES NFR BLD: 8 % (ref 4–15)
NEUTROPHILS # BLD AUTO: 3.9 K/UL (ref 1.8–7.7)
NEUTROPHILS NFR BLD: 52.6 % (ref 38–73)
NONHDLC SERPL-MCNC: 154 MG/DL
NRBC BLD-RTO: 0 /100 WBC
PLATELET # BLD AUTO: 234 K/UL (ref 150–350)
PMV BLD AUTO: 11.8 FL (ref 9.2–12.9)
POTASSIUM SERPL-SCNC: 4.4 MMOL/L (ref 3.5–5.1)
PROT SERPL-MCNC: 7.4 G/DL (ref 6–8.4)
RBC # BLD AUTO: 5.17 M/UL (ref 4.6–6.2)
SARS-COV-2 IGG SERPLBLD QL IA.RAPID: POSITIVE
SODIUM SERPL-SCNC: 140 MMOL/L (ref 136–145)
TRIGL SERPL-MCNC: 86 MG/DL (ref 30–150)
TSH SERPL DL<=0.005 MIU/L-ACNC: 2.84 UIU/ML (ref 0.4–4)
WBC # BLD AUTO: 7.38 K/UL (ref 3.9–12.7)

## 2021-01-11 PROCEDURE — 99999 PR PBB SHADOW E&M-EST. PATIENT-LVL IV: CPT | Mod: PBBFAC,HCNC,, | Performed by: INTERNAL MEDICINE

## 2021-01-11 PROCEDURE — 83036 HEMOGLOBIN GLYCOSYLATED A1C: CPT | Mod: HCNC

## 2021-01-11 PROCEDURE — 90670 PNEUMOCOCCAL CONJUGATE VACCINE 13-VALENT LESS THAN 5YO & GREATER THAN: ICD-10-PCS | Mod: HCNC,S$GLB,, | Performed by: INTERNAL MEDICINE

## 2021-01-11 PROCEDURE — 99214 PR OFFICE/OUTPT VISIT, EST, LEVL IV, 30-39 MIN: ICD-10-PCS | Mod: 25,HCNC,S$GLB, | Performed by: INTERNAL MEDICINE

## 2021-01-11 PROCEDURE — 80061 LIPID PANEL: CPT | Mod: HCNC

## 2021-01-11 PROCEDURE — G0009 PNEUMOCOCCAL CONJUGATE VACCINE 13-VALENT LESS THAN 5YO & GREATER THAN: ICD-10-PCS | Mod: HCNC,S$GLB,, | Performed by: INTERNAL MEDICINE

## 2021-01-11 PROCEDURE — 86769 SARS-COV-2 COVID-19 ANTIBODY: CPT | Mod: HCNC

## 2021-01-11 PROCEDURE — G0009 ADMIN PNEUMOCOCCAL VACCINE: HCPCS | Mod: HCNC,S$GLB,, | Performed by: INTERNAL MEDICINE

## 2021-01-11 PROCEDURE — G0008 FLU VACCINE - QUADRIVALENT - ADJUVANTED: ICD-10-PCS | Mod: HCNC,S$GLB,, | Performed by: INTERNAL MEDICINE

## 2021-01-11 PROCEDURE — 84153 ASSAY OF PSA TOTAL: CPT | Mod: HCNC

## 2021-01-11 PROCEDURE — 36415 COLL VENOUS BLD VENIPUNCTURE: CPT | Mod: HCNC,PO

## 2021-01-11 PROCEDURE — 99214 OFFICE O/P EST MOD 30 MIN: CPT | Mod: 25,HCNC,S$GLB, | Performed by: INTERNAL MEDICINE

## 2021-01-11 PROCEDURE — 85025 COMPLETE CBC W/AUTO DIFF WBC: CPT | Mod: HCNC

## 2021-01-11 PROCEDURE — 99397 PR PREVENTIVE VISIT,EST,65 & OVER: ICD-10-PCS | Mod: 25,HCNC,S$GLB, | Performed by: INTERNAL MEDICINE

## 2021-01-11 PROCEDURE — 3008F PR BODY MASS INDEX (BMI) DOCUMENTED: ICD-10-PCS | Mod: HCNC,CPTII,S$GLB, | Performed by: INTERNAL MEDICINE

## 2021-01-11 PROCEDURE — 84443 ASSAY THYROID STIM HORMONE: CPT | Mod: HCNC

## 2021-01-11 PROCEDURE — G0008 ADMIN INFLUENZA VIRUS VAC: HCPCS | Mod: HCNC,S$GLB,, | Performed by: INTERNAL MEDICINE

## 2021-01-11 PROCEDURE — 3074F PR MOST RECENT SYSTOLIC BLOOD PRESSURE < 130 MM HG: ICD-10-PCS | Mod: HCNC,CPTII,S$GLB, | Performed by: INTERNAL MEDICINE

## 2021-01-11 PROCEDURE — 99999 PR PBB SHADOW E&M-EST. PATIENT-LVL IV: ICD-10-PCS | Mod: PBBFAC,HCNC,, | Performed by: INTERNAL MEDICINE

## 2021-01-11 PROCEDURE — 3044F HG A1C LEVEL LT 7.0%: CPT | Mod: HCNC,CPTII,S$GLB, | Performed by: INTERNAL MEDICINE

## 2021-01-11 PROCEDURE — 99397 PER PM REEVAL EST PAT 65+ YR: CPT | Mod: 25,HCNC,S$GLB, | Performed by: INTERNAL MEDICINE

## 2021-01-11 PROCEDURE — 3078F DIAST BP <80 MM HG: CPT | Mod: HCNC,CPTII,S$GLB, | Performed by: INTERNAL MEDICINE

## 2021-01-11 PROCEDURE — 3074F SYST BP LT 130 MM HG: CPT | Mod: HCNC,CPTII,S$GLB, | Performed by: INTERNAL MEDICINE

## 2021-01-11 PROCEDURE — 3288F FALL RISK ASSESSMENT DOCD: CPT | Mod: HCNC,CPTII,S$GLB, | Performed by: INTERNAL MEDICINE

## 2021-01-11 PROCEDURE — 3078F PR MOST RECENT DIASTOLIC BLOOD PRESSURE < 80 MM HG: ICD-10-PCS | Mod: HCNC,CPTII,S$GLB, | Performed by: INTERNAL MEDICINE

## 2021-01-11 PROCEDURE — 1101F PT FALLS ASSESS-DOCD LE1/YR: CPT | Mod: HCNC,CPTII,S$GLB, | Performed by: INTERNAL MEDICINE

## 2021-01-11 PROCEDURE — 82306 VITAMIN D 25 HYDROXY: CPT | Mod: HCNC

## 2021-01-11 PROCEDURE — 90670 PCV13 VACCINE IM: CPT | Mod: HCNC,S$GLB,, | Performed by: INTERNAL MEDICINE

## 2021-01-11 PROCEDURE — 3288F PR FALLS RISK ASSESSMENT DOCUMENTED: ICD-10-PCS | Mod: HCNC,CPTII,S$GLB, | Performed by: INTERNAL MEDICINE

## 2021-01-11 PROCEDURE — 99499 UNLISTED E&M SERVICE: CPT | Mod: S$GLB,,, | Performed by: INTERNAL MEDICINE

## 2021-01-11 PROCEDURE — 3008F BODY MASS INDEX DOCD: CPT | Mod: HCNC,CPTII,S$GLB, | Performed by: INTERNAL MEDICINE

## 2021-01-11 PROCEDURE — 3044F PR MOST RECENT HEMOGLOBIN A1C LEVEL <7.0%: ICD-10-PCS | Mod: HCNC,CPTII,S$GLB, | Performed by: INTERNAL MEDICINE

## 2021-01-11 PROCEDURE — 1101F PR PT FALLS ASSESS DOC 0-1 FALLS W/OUT INJ PAST YR: ICD-10-PCS | Mod: HCNC,CPTII,S$GLB, | Performed by: INTERNAL MEDICINE

## 2021-01-11 PROCEDURE — 90694 VACC AIIV4 NO PRSRV 0.5ML IM: CPT | Mod: HCNC,S$GLB,, | Performed by: INTERNAL MEDICINE

## 2021-01-11 PROCEDURE — 90694 FLU VACCINE - QUADRIVALENT - ADJUVANTED: ICD-10-PCS | Mod: HCNC,S$GLB,, | Performed by: INTERNAL MEDICINE

## 2021-01-11 PROCEDURE — 99499 RISK ADDL DX/OHS AUDIT: ICD-10-PCS | Mod: S$GLB,,, | Performed by: INTERNAL MEDICINE

## 2021-01-11 PROCEDURE — 80053 COMPREHEN METABOLIC PANEL: CPT | Mod: HCNC

## 2021-01-11 RX ORDER — CLOTRIMAZOLE AND BETAMETHASONE DIPROPIONATE 10; .64 MG/G; MG/G
CREAM TOPICAL 2 TIMES DAILY
Qty: 45 G | Refills: 3 | Status: SHIPPED | OUTPATIENT
Start: 2021-01-11 | End: 2021-09-06

## 2021-01-19 RX ORDER — IRBESARTAN 300 MG/1
TABLET ORAL
Qty: 90 TABLET | Refills: 12 | Status: SHIPPED | OUTPATIENT
Start: 2021-01-19 | End: 2021-08-05 | Stop reason: SDUPTHER

## 2021-02-03 RX ORDER — INDOMETHACIN 25 MG/1
CAPSULE ORAL
Qty: 60 CAPSULE | Refills: 1 | Status: SHIPPED | OUTPATIENT
Start: 2021-02-03 | End: 2021-03-02

## 2021-02-03 RX ORDER — ALLOPURINOL 100 MG/1
100 TABLET ORAL DAILY
Qty: 90 TABLET | Refills: 90 | Status: SHIPPED | OUTPATIENT
Start: 2021-02-03 | End: 2022-01-12 | Stop reason: SDUPTHER

## 2021-02-03 RX ORDER — IRBESARTAN 300 MG/1
300 TABLET ORAL NIGHTLY
Qty: 30 TABLET | Refills: 6 | Status: SHIPPED | OUTPATIENT
Start: 2021-02-03 | End: 2021-08-05 | Stop reason: SDUPTHER

## 2021-02-03 RX ORDER — AMLODIPINE BESYLATE 5 MG/1
TABLET ORAL
Qty: 90 TABLET | Refills: 3 | Status: SHIPPED | OUTPATIENT
Start: 2021-02-03 | End: 2022-01-04

## 2021-02-03 RX ORDER — MONTELUKAST SODIUM 10 MG/1
10 TABLET ORAL DAILY
Qty: 90 TABLET | Refills: 12 | Status: SHIPPED | OUTPATIENT
Start: 2021-02-03 | End: 2021-08-05 | Stop reason: SDUPTHER

## 2021-05-13 RX ORDER — PANTOPRAZOLE SODIUM 40 MG/1
40 TABLET, DELAYED RELEASE ORAL
Qty: 90 TABLET | Refills: 12 | Status: SHIPPED | OUTPATIENT
Start: 2021-05-13 | End: 2022-01-12 | Stop reason: SDUPTHER

## 2021-06-08 ENCOUNTER — PATIENT OUTREACH (OUTPATIENT)
Dept: ADMINISTRATIVE | Facility: HOSPITAL | Age: 66
End: 2021-06-08

## 2021-06-08 ENCOUNTER — PATIENT MESSAGE (OUTPATIENT)
Dept: ADMINISTRATIVE | Facility: HOSPITAL | Age: 66
End: 2021-06-08

## 2021-06-08 DIAGNOSIS — E11.9 TYPE 2 DIABETES MELLITUS WITHOUT COMPLICATION, WITHOUT LONG-TERM CURRENT USE OF INSULIN: Primary | ICD-10-CM

## 2021-06-30 RX ORDER — IRBESARTAN 300 MG/1
TABLET ORAL
Qty: 90 TABLET | Refills: 12 | Status: SHIPPED | OUTPATIENT
Start: 2021-06-30 | End: 2021-08-05 | Stop reason: SDUPTHER

## 2021-07-13 RX ORDER — INDOMETHACIN 25 MG/1
CAPSULE ORAL
Qty: 30 CAPSULE | Refills: 0 | Status: SHIPPED | OUTPATIENT
Start: 2021-07-13 | End: 2021-07-20

## 2021-07-27 LAB
LEFT EYE DM RETINOPATHY: NORMAL
RIGHT EYE DM RETINOPATHY: NORMAL

## 2021-08-04 ENCOUNTER — PATIENT MESSAGE (OUTPATIENT)
Dept: ADMINISTRATIVE | Facility: HOSPITAL | Age: 66
End: 2021-08-04

## 2021-08-05 ENCOUNTER — OFFICE VISIT (OUTPATIENT)
Dept: FAMILY MEDICINE | Facility: CLINIC | Age: 66
End: 2021-08-05
Payer: MEDICARE

## 2021-08-05 ENCOUNTER — HOSPITAL ENCOUNTER (OUTPATIENT)
Dept: RADIOLOGY | Facility: HOSPITAL | Age: 66
Discharge: HOME | End: 2021-08-05
Attending: NURSE PRACTITIONER
Payer: MEDICARE

## 2021-08-05 ENCOUNTER — PATIENT OUTREACH (OUTPATIENT)
Dept: ADMINISTRATIVE | Facility: HOSPITAL | Age: 66
End: 2021-08-05

## 2021-08-05 ENCOUNTER — PATIENT MESSAGE (OUTPATIENT)
Dept: ADMINISTRATIVE | Facility: HOSPITAL | Age: 66
End: 2021-08-05

## 2021-08-05 VITALS
HEART RATE: 89 BPM | BODY MASS INDEX: 28.66 KG/M2 | OXYGEN SATURATION: 95 % | SYSTOLIC BLOOD PRESSURE: 130 MMHG | WEIGHT: 172 LBS | HEIGHT: 65 IN | DIASTOLIC BLOOD PRESSURE: 82 MMHG

## 2021-08-05 DIAGNOSIS — R05.9 COUGH: Primary | ICD-10-CM

## 2021-08-05 DIAGNOSIS — H61.21 IMPACTED CERUMEN OF RIGHT EAR: ICD-10-CM

## 2021-08-05 DIAGNOSIS — R06.00 DYSPNEA, UNSPECIFIED TYPE: ICD-10-CM

## 2021-08-05 DIAGNOSIS — R05.9 COUGH: ICD-10-CM

## 2021-08-05 DIAGNOSIS — R06.89 ABNORMAL BREATH SOUNDS: ICD-10-CM

## 2021-08-05 DIAGNOSIS — R06.02 SHORTNESS OF BREATH: ICD-10-CM

## 2021-08-05 DIAGNOSIS — E11.9 TYPE 2 DIABETES MELLITUS WITHOUT COMPLICATION, WITHOUT LONG-TERM CURRENT USE OF INSULIN: Primary | ICD-10-CM

## 2021-08-05 DIAGNOSIS — R51.9 NONINTRACTABLE HEADACHE, UNSPECIFIED CHRONICITY PATTERN, UNSPECIFIED HEADACHE TYPE: ICD-10-CM

## 2021-08-05 PROCEDURE — 3044F PR MOST RECENT HEMOGLOBIN A1C LEVEL <7.0%: ICD-10-PCS | Mod: S$GLB,,, | Performed by: NURSE PRACTITIONER

## 2021-08-05 PROCEDURE — 71046 X-RAY EXAM CHEST 2 VIEWS: CPT | Mod: TC

## 2021-08-05 PROCEDURE — U0003 INFECTIOUS AGENT DETECTION BY NUCLEIC ACID (DNA OR RNA); SEVERE ACUTE RESPIRATORY SYNDROME CORONAVIRUS 2 (SARS-COV-2) (CORONAVIRUS DISEASE [COVID-19]), AMPLIFIED PROBE TECHNIQUE, MAKING USE OF HIGH THROUGHPUT TECHNOLOGIES AS DESCRIBED BY CMS-2020-01-R: HCPCS | Performed by: NURSE PRACTITIONER

## 2021-08-05 PROCEDURE — 3288F PR FALLS RISK ASSESSMENT DOCUMENTED: ICD-10-PCS | Mod: S$GLB,,, | Performed by: NURSE PRACTITIONER

## 2021-08-05 PROCEDURE — 3075F SYST BP GE 130 - 139MM HG: CPT | Mod: S$GLB,,, | Performed by: NURSE PRACTITIONER

## 2021-08-05 PROCEDURE — 3008F BODY MASS INDEX DOCD: CPT | Mod: S$GLB,,, | Performed by: NURSE PRACTITIONER

## 2021-08-05 PROCEDURE — 1160F RVW MEDS BY RX/DR IN RCRD: CPT | Mod: S$GLB,,, | Performed by: NURSE PRACTITIONER

## 2021-08-05 PROCEDURE — 3044F HG A1C LEVEL LT 7.0%: CPT | Mod: S$GLB,,, | Performed by: NURSE PRACTITIONER

## 2021-08-05 PROCEDURE — U0005 INFEC AGEN DETEC AMPLI PROBE: HCPCS | Performed by: NURSE PRACTITIONER

## 2021-08-05 PROCEDURE — 1126F AMNT PAIN NOTED NONE PRSNT: CPT | Mod: S$GLB,,, | Performed by: NURSE PRACTITIONER

## 2021-08-05 PROCEDURE — 1101F PT FALLS ASSESS-DOCD LE1/YR: CPT | Mod: S$GLB,,, | Performed by: NURSE PRACTITIONER

## 2021-08-05 PROCEDURE — 3075F PR MOST RECENT SYSTOLIC BLOOD PRESS GE 130-139MM HG: ICD-10-PCS | Mod: S$GLB,,, | Performed by: NURSE PRACTITIONER

## 2021-08-05 PROCEDURE — 99204 PR OFFICE/OUTPT VISIT, NEW, LEVL IV, 45-59 MIN: ICD-10-PCS | Mod: S$GLB,,, | Performed by: NURSE PRACTITIONER

## 2021-08-05 PROCEDURE — 99204 OFFICE O/P NEW MOD 45 MIN: CPT | Mod: S$GLB,,, | Performed by: NURSE PRACTITIONER

## 2021-08-05 PROCEDURE — 3008F PR BODY MASS INDEX (BMI) DOCUMENTED: ICD-10-PCS | Mod: S$GLB,,, | Performed by: NURSE PRACTITIONER

## 2021-08-05 PROCEDURE — 1160F PR REVIEW ALL MEDS BY PRESCRIBER/CLIN PHARMACIST DOCUMENTED: ICD-10-PCS | Mod: S$GLB,,, | Performed by: NURSE PRACTITIONER

## 2021-08-05 PROCEDURE — 3079F PR MOST RECENT DIASTOLIC BLOOD PRESSURE 80-89 MM HG: ICD-10-PCS | Mod: S$GLB,,, | Performed by: NURSE PRACTITIONER

## 2021-08-05 PROCEDURE — 1126F PR PAIN SEVERITY QUANTIFIED, NO PAIN PRESENT: ICD-10-PCS | Mod: S$GLB,,, | Performed by: NURSE PRACTITIONER

## 2021-08-05 PROCEDURE — 1101F PR PT FALLS ASSESS DOC 0-1 FALLS W/OUT INJ PAST YR: ICD-10-PCS | Mod: S$GLB,,, | Performed by: NURSE PRACTITIONER

## 2021-08-05 PROCEDURE — 3288F FALL RISK ASSESSMENT DOCD: CPT | Mod: S$GLB,,, | Performed by: NURSE PRACTITIONER

## 2021-08-05 PROCEDURE — 3079F DIAST BP 80-89 MM HG: CPT | Mod: S$GLB,,, | Performed by: NURSE PRACTITIONER

## 2021-08-05 RX ORDER — BENZONATATE 100 MG/1
100 CAPSULE ORAL 3 TIMES DAILY PRN
Qty: 30 CAPSULE | Refills: 0 | Status: SHIPPED | OUTPATIENT
Start: 2021-08-05 | End: 2024-03-04

## 2021-08-05 RX ORDER — MONTELUKAST SODIUM 10 MG/1
10 TABLET ORAL DAILY
Qty: 90 TABLET | Refills: 12 | Status: SHIPPED | OUTPATIENT
Start: 2021-08-05 | End: 2021-08-05 | Stop reason: SDUPTHER

## 2021-08-05 RX ORDER — ALBUTEROL SULFATE 90 UG/1
1-2 AEROSOL, METERED RESPIRATORY (INHALATION) EVERY 6 HOURS PRN
Qty: 18 G | Refills: 0 | Status: SHIPPED | OUTPATIENT
Start: 2021-08-05 | End: 2024-03-04

## 2021-08-05 RX ORDER — MONTELUKAST SODIUM 10 MG/1
10 TABLET ORAL DAILY
Qty: 30 TABLET | Refills: 2 | Status: SHIPPED | OUTPATIENT
Start: 2021-08-05 | End: 2022-01-12 | Stop reason: SDUPTHER

## 2021-08-06 LAB
SARS-COV-2 RNA RESP QL NAA+PROBE: NOT DETECTED
SARS-COV-2- CYCLE NUMBER: -1

## 2021-08-09 ENCOUNTER — TELEPHONE (OUTPATIENT)
Dept: FAMILY MEDICINE | Facility: CLINIC | Age: 66
End: 2021-08-09

## 2021-08-09 ENCOUNTER — PATIENT MESSAGE (OUTPATIENT)
Dept: FAMILY MEDICINE | Facility: CLINIC | Age: 66
End: 2021-08-09

## 2021-08-09 DIAGNOSIS — R06.00 DYSPNEA, UNSPECIFIED TYPE: ICD-10-CM

## 2021-08-09 DIAGNOSIS — R05.9 COUGH: Primary | ICD-10-CM

## 2021-08-09 DIAGNOSIS — R06.89 ABNORMAL BREATH SOUNDS: ICD-10-CM

## 2021-08-10 ENCOUNTER — TELEPHONE (OUTPATIENT)
Dept: FAMILY MEDICINE | Facility: CLINIC | Age: 66
End: 2021-08-10

## 2021-08-10 ENCOUNTER — TELEPHONE (OUTPATIENT)
Dept: PULMONOLOGY | Facility: CLINIC | Age: 66
End: 2021-08-10

## 2021-08-10 DIAGNOSIS — G47.33 OSA (OBSTRUCTIVE SLEEP APNEA): Primary | ICD-10-CM

## 2021-08-10 DIAGNOSIS — R06.02 SHORTNESS OF BREATH: ICD-10-CM

## 2021-08-12 ENCOUNTER — OFFICE VISIT (OUTPATIENT)
Dept: PULMONOLOGY | Facility: CLINIC | Age: 66
End: 2021-08-12
Payer: MEDICARE

## 2021-08-12 ENCOUNTER — LAB VISIT (OUTPATIENT)
Dept: LAB | Facility: HOSPITAL | Age: 66
End: 2021-08-12
Payer: MEDICARE

## 2021-08-12 VITALS
OXYGEN SATURATION: 96 % | BODY MASS INDEX: 28.72 KG/M2 | SYSTOLIC BLOOD PRESSURE: 130 MMHG | HEIGHT: 65 IN | DIASTOLIC BLOOD PRESSURE: 80 MMHG | HEART RATE: 91 BPM | WEIGHT: 172.38 LBS

## 2021-08-12 DIAGNOSIS — R05.9 COUGH: Primary | ICD-10-CM

## 2021-08-12 DIAGNOSIS — R05.9 COUGH: ICD-10-CM

## 2021-08-12 DIAGNOSIS — R09.81 NASAL CONGESTION: ICD-10-CM

## 2021-08-12 LAB
BASOPHILS # BLD AUTO: 0.07 K/UL (ref 0–0.2)
BASOPHILS NFR BLD: 0.8 % (ref 0–1.9)
DIFFERENTIAL METHOD: ABNORMAL
EOSINOPHIL # BLD AUTO: 0.9 K/UL (ref 0–0.5)
EOSINOPHIL NFR BLD: 10.3 % (ref 0–8)
ERYTHROCYTE [DISTWIDTH] IN BLOOD BY AUTOMATED COUNT: 14.2 % (ref 11.5–14.5)
HCT VFR BLD AUTO: 45.5 % (ref 40–54)
HGB BLD-MCNC: 15.3 G/DL (ref 14–18)
IMM GRANULOCYTES # BLD AUTO: 0.03 K/UL (ref 0–0.04)
IMM GRANULOCYTES NFR BLD AUTO: 0.4 % (ref 0–0.5)
LYMPHOCYTES # BLD AUTO: 1.9 K/UL (ref 1–4.8)
LYMPHOCYTES NFR BLD: 22.5 % (ref 18–48)
MCH RBC QN AUTO: 29.8 PG (ref 27–31)
MCHC RBC AUTO-ENTMCNC: 33.6 G/DL (ref 32–36)
MCV RBC AUTO: 89 FL (ref 82–98)
MONOCYTES # BLD AUTO: 0.7 K/UL (ref 0.3–1)
MONOCYTES NFR BLD: 8.1 % (ref 4–15)
NEUTROPHILS # BLD AUTO: 4.9 K/UL (ref 1.8–7.7)
NEUTROPHILS NFR BLD: 57.9 % (ref 38–73)
NRBC BLD-RTO: 0 /100 WBC
PLATELET # BLD AUTO: 231 K/UL (ref 150–450)
PMV BLD AUTO: 11.4 FL (ref 9.2–12.9)
RBC # BLD AUTO: 5.14 M/UL (ref 4.6–6.2)
WBC # BLD AUTO: 8.44 K/UL (ref 3.9–12.7)

## 2021-08-12 PROCEDURE — 1101F PT FALLS ASSESS-DOCD LE1/YR: CPT | Mod: CPTII,S$GLB,, | Performed by: NURSE PRACTITIONER

## 2021-08-12 PROCEDURE — 3044F PR MOST RECENT HEMOGLOBIN A1C LEVEL <7.0%: ICD-10-PCS | Mod: CPTII,S$GLB,, | Performed by: NURSE PRACTITIONER

## 2021-08-12 PROCEDURE — 99204 OFFICE O/P NEW MOD 45 MIN: CPT | Mod: S$GLB,,, | Performed by: NURSE PRACTITIONER

## 2021-08-12 PROCEDURE — 99999 PR PBB SHADOW E&M-EST. PATIENT-LVL IV: ICD-10-PCS | Mod: PBBFAC,,, | Performed by: NURSE PRACTITIONER

## 2021-08-12 PROCEDURE — 99999 PR PBB SHADOW E&M-EST. PATIENT-LVL IV: CPT | Mod: PBBFAC,,, | Performed by: NURSE PRACTITIONER

## 2021-08-12 PROCEDURE — 36415 COLL VENOUS BLD VENIPUNCTURE: CPT | Performed by: NURSE PRACTITIONER

## 2021-08-12 PROCEDURE — 1160F RVW MEDS BY RX/DR IN RCRD: CPT | Mod: CPTII,S$GLB,, | Performed by: NURSE PRACTITIONER

## 2021-08-12 PROCEDURE — 3008F BODY MASS INDEX DOCD: CPT | Mod: CPTII,S$GLB,, | Performed by: NURSE PRACTITIONER

## 2021-08-12 PROCEDURE — 3288F PR FALLS RISK ASSESSMENT DOCUMENTED: ICD-10-PCS | Mod: CPTII,S$GLB,, | Performed by: NURSE PRACTITIONER

## 2021-08-12 PROCEDURE — 3075F SYST BP GE 130 - 139MM HG: CPT | Mod: CPTII,S$GLB,, | Performed by: NURSE PRACTITIONER

## 2021-08-12 PROCEDURE — 3288F FALL RISK ASSESSMENT DOCD: CPT | Mod: CPTII,S$GLB,, | Performed by: NURSE PRACTITIONER

## 2021-08-12 PROCEDURE — 85025 COMPLETE CBC W/AUTO DIFF WBC: CPT | Performed by: NURSE PRACTITIONER

## 2021-08-12 PROCEDURE — 1126F AMNT PAIN NOTED NONE PRSNT: CPT | Mod: CPTII,S$GLB,, | Performed by: NURSE PRACTITIONER

## 2021-08-12 PROCEDURE — 1101F PR PT FALLS ASSESS DOC 0-1 FALLS W/OUT INJ PAST YR: ICD-10-PCS | Mod: CPTII,S$GLB,, | Performed by: NURSE PRACTITIONER

## 2021-08-12 PROCEDURE — 1160F PR REVIEW ALL MEDS BY PRESCRIBER/CLIN PHARMACIST DOCUMENTED: ICD-10-PCS | Mod: CPTII,S$GLB,, | Performed by: NURSE PRACTITIONER

## 2021-08-12 PROCEDURE — 1126F PR PAIN SEVERITY QUANTIFIED, NO PAIN PRESENT: ICD-10-PCS | Mod: CPTII,S$GLB,, | Performed by: NURSE PRACTITIONER

## 2021-08-12 PROCEDURE — 3008F PR BODY MASS INDEX (BMI) DOCUMENTED: ICD-10-PCS | Mod: CPTII,S$GLB,, | Performed by: NURSE PRACTITIONER

## 2021-08-12 PROCEDURE — 1159F MED LIST DOCD IN RCRD: CPT | Mod: CPTII,S$GLB,, | Performed by: NURSE PRACTITIONER

## 2021-08-12 PROCEDURE — 82785 ASSAY OF IGE: CPT | Performed by: NURSE PRACTITIONER

## 2021-08-12 PROCEDURE — 3079F PR MOST RECENT DIASTOLIC BLOOD PRESSURE 80-89 MM HG: ICD-10-PCS | Mod: CPTII,S$GLB,, | Performed by: NURSE PRACTITIONER

## 2021-08-12 PROCEDURE — 3044F HG A1C LEVEL LT 7.0%: CPT | Mod: CPTII,S$GLB,, | Performed by: NURSE PRACTITIONER

## 2021-08-12 PROCEDURE — 1159F PR MEDICATION LIST DOCUMENTED IN MEDICAL RECORD: ICD-10-PCS | Mod: CPTII,S$GLB,, | Performed by: NURSE PRACTITIONER

## 2021-08-12 PROCEDURE — 3079F DIAST BP 80-89 MM HG: CPT | Mod: CPTII,S$GLB,, | Performed by: NURSE PRACTITIONER

## 2021-08-12 PROCEDURE — 3075F PR MOST RECENT SYSTOLIC BLOOD PRESS GE 130-139MM HG: ICD-10-PCS | Mod: CPTII,S$GLB,, | Performed by: NURSE PRACTITIONER

## 2021-08-12 PROCEDURE — 99204 PR OFFICE/OUTPT VISIT, NEW, LEVL IV, 45-59 MIN: ICD-10-PCS | Mod: S$GLB,,, | Performed by: NURSE PRACTITIONER

## 2021-08-12 RX ORDER — GUAIFENESIN AND DEXTROMETHORPHAN HYDROBROMIDE 600; 30 MG/1; MG/1
TABLET, EXTENDED RELEASE ORAL
COMMUNITY
Start: 2021-08-09 | End: 2023-01-18

## 2021-08-12 RX ORDER — FLUTICASONE FUROATE AND VILANTEROL TRIFENATATE 100; 25 UG/1; UG/1
1 POWDER RESPIRATORY (INHALATION) DAILY
Qty: 30 EACH | Refills: 0 | Status: SHIPPED | OUTPATIENT
Start: 2021-08-12 | End: 2024-03-04

## 2021-08-13 ENCOUNTER — TELEPHONE (OUTPATIENT)
Dept: PULMONOLOGY | Facility: CLINIC | Age: 66
End: 2021-08-13

## 2021-08-13 LAB — IGE SERPL-ACNC: 202 IU/ML (ref 0–100)

## 2021-08-15 PROBLEM — R09.81 NASAL CONGESTION: Status: ACTIVE | Noted: 2021-08-15

## 2021-08-15 PROBLEM — R05.9 COUGH: Status: ACTIVE | Noted: 2021-08-15

## 2021-08-16 ENCOUNTER — TELEPHONE (OUTPATIENT)
Dept: PULMONOLOGY | Facility: CLINIC | Age: 66
End: 2021-08-16

## 2021-10-18 ENCOUNTER — PATIENT MESSAGE (OUTPATIENT)
Dept: ADMINISTRATIVE | Facility: HOSPITAL | Age: 66
End: 2021-10-18
Payer: MEDICARE

## 2022-01-12 ENCOUNTER — LAB VISIT (OUTPATIENT)
Dept: LAB | Facility: HOSPITAL | Age: 67
End: 2022-01-12
Attending: INTERNAL MEDICINE
Payer: MEDICARE

## 2022-01-12 ENCOUNTER — OFFICE VISIT (OUTPATIENT)
Dept: FAMILY MEDICINE | Facility: CLINIC | Age: 67
End: 2022-01-12
Payer: MEDICARE

## 2022-01-12 VITALS
OXYGEN SATURATION: 97 % | SYSTOLIC BLOOD PRESSURE: 132 MMHG | HEART RATE: 62 BPM | HEIGHT: 65 IN | WEIGHT: 174.81 LBS | TEMPERATURE: 98 F | BODY MASS INDEX: 29.12 KG/M2 | DIASTOLIC BLOOD PRESSURE: 70 MMHG

## 2022-01-12 DIAGNOSIS — N40.0 BENIGN PROSTATIC HYPERPLASIA, UNSPECIFIED WHETHER LOWER URINARY TRACT SYMPTOMS PRESENT: ICD-10-CM

## 2022-01-12 DIAGNOSIS — R05.9 COUGH: ICD-10-CM

## 2022-01-12 DIAGNOSIS — E11.65 UNCONTROLLED TYPE 2 DIABETES MELLITUS WITH HYPERGLYCEMIA: ICD-10-CM

## 2022-01-12 DIAGNOSIS — M48.061 BILATERAL STENOSIS OF LATERAL RECESS OF LUMBAR SPINE: ICD-10-CM

## 2022-01-12 DIAGNOSIS — Z00.00 ROUTINE MEDICAL EXAM: Primary | ICD-10-CM

## 2022-01-12 DIAGNOSIS — J44.9 CHRONIC OBSTRUCTIVE PULMONARY DISEASE, UNSPECIFIED COPD TYPE: ICD-10-CM

## 2022-01-12 DIAGNOSIS — R79.89 ABNORMAL LIVER FUNCTION TESTS: ICD-10-CM

## 2022-01-12 DIAGNOSIS — M1A.9XX0 CHRONIC GOUT WITHOUT TOPHUS, UNSPECIFIED CAUSE, UNSPECIFIED SITE: ICD-10-CM

## 2022-01-12 DIAGNOSIS — Z86.010 HISTORY OF COLONIC POLYPS: ICD-10-CM

## 2022-01-12 DIAGNOSIS — Z12.5 SCREENING FOR PROSTATE CANCER: ICD-10-CM

## 2022-01-12 DIAGNOSIS — M47.812 CERVICAL SPONDYLOSIS: ICD-10-CM

## 2022-01-12 DIAGNOSIS — E55.9 VITAMIN D DEFICIENCY DISEASE: ICD-10-CM

## 2022-01-12 DIAGNOSIS — G47.33 OSA (OBSTRUCTIVE SLEEP APNEA): ICD-10-CM

## 2022-01-12 DIAGNOSIS — K21.9 GASTROESOPHAGEAL REFLUX DISEASE, UNSPECIFIED WHETHER ESOPHAGITIS PRESENT: ICD-10-CM

## 2022-01-12 DIAGNOSIS — M54.12 CERVICAL RADICULOPATHY: ICD-10-CM

## 2022-01-12 DIAGNOSIS — E78.5 HYPERLIPIDEMIA, UNSPECIFIED HYPERLIPIDEMIA TYPE: ICD-10-CM

## 2022-01-12 DIAGNOSIS — E78.6 HDL LIPOPROTEIN DEFICIENCY: ICD-10-CM

## 2022-01-12 DIAGNOSIS — Z00.00 ROUTINE MEDICAL EXAM: ICD-10-CM

## 2022-01-12 DIAGNOSIS — I10 ESSENTIAL HYPERTENSION: ICD-10-CM

## 2022-01-12 LAB
ALBUMIN SERPL BCP-MCNC: 3.9 G/DL (ref 3.5–5.2)
ALP SERPL-CCNC: 71 U/L (ref 55–135)
ALT SERPL W/O P-5'-P-CCNC: 17 U/L (ref 10–44)
ANION GAP SERPL CALC-SCNC: 8 MMOL/L (ref 8–16)
AST SERPL-CCNC: 20 U/L (ref 10–40)
BASOPHILS # BLD AUTO: 0.07 K/UL (ref 0–0.2)
BASOPHILS NFR BLD: 0.9 % (ref 0–1.9)
BILIRUB SERPL-MCNC: 0.6 MG/DL (ref 0.1–1)
BUN SERPL-MCNC: 15 MG/DL (ref 8–23)
CALCIUM SERPL-MCNC: 9 MG/DL (ref 8.7–10.5)
CHLORIDE SERPL-SCNC: 103 MMOL/L (ref 95–110)
CHOLEST SERPL-MCNC: 185 MG/DL (ref 120–199)
CHOLEST/HDLC SERPL: 3.4 {RATIO} (ref 2–5)
CO2 SERPL-SCNC: 28 MMOL/L (ref 23–29)
COMPLEXED PSA SERPL-MCNC: 1.2 NG/ML (ref 0–4)
CREAT SERPL-MCNC: 0.7 MG/DL (ref 0.5–1.4)
DIFFERENTIAL METHOD: ABNORMAL
EOSINOPHIL # BLD AUTO: 0.5 K/UL (ref 0–0.5)
EOSINOPHIL NFR BLD: 5.8 % (ref 0–8)
ERYTHROCYTE [DISTWIDTH] IN BLOOD BY AUTOMATED COUNT: 14.1 % (ref 11.5–14.5)
EST. GFR  (AFRICAN AMERICAN): >60 ML/MIN/1.73 M^2
EST. GFR  (NON AFRICAN AMERICAN): >60 ML/MIN/1.73 M^2
ESTIMATED AVG GLUCOSE: 117 MG/DL (ref 68–131)
GLUCOSE SERPL-MCNC: 98 MG/DL (ref 70–110)
HBA1C MFR BLD: 5.7 % (ref 4–5.6)
HCT VFR BLD AUTO: 46.1 % (ref 40–54)
HDLC SERPL-MCNC: 54 MG/DL (ref 40–75)
HDLC SERPL: 29.2 % (ref 20–50)
HGB BLD-MCNC: 14.7 G/DL (ref 14–18)
IMM GRANULOCYTES # BLD AUTO: 0.02 K/UL (ref 0–0.04)
IMM GRANULOCYTES NFR BLD AUTO: 0.3 % (ref 0–0.5)
LDLC SERPL CALC-MCNC: 114.2 MG/DL (ref 63–159)
LYMPHOCYTES # BLD AUTO: 2.2 K/UL (ref 1–4.8)
LYMPHOCYTES NFR BLD: 28.7 % (ref 18–48)
MCH RBC QN AUTO: 29.9 PG (ref 27–31)
MCHC RBC AUTO-ENTMCNC: 31.9 G/DL (ref 32–36)
MCV RBC AUTO: 94 FL (ref 82–98)
MONOCYTES # BLD AUTO: 0.7 K/UL (ref 0.3–1)
MONOCYTES NFR BLD: 8.7 % (ref 4–15)
NEUTROPHILS # BLD AUTO: 4.3 K/UL (ref 1.8–7.7)
NEUTROPHILS NFR BLD: 55.6 % (ref 38–73)
NONHDLC SERPL-MCNC: 131 MG/DL
NRBC BLD-RTO: 0 /100 WBC
PLATELET # BLD AUTO: 236 K/UL (ref 150–450)
PMV BLD AUTO: 11.5 FL (ref 9.2–12.9)
POTASSIUM SERPL-SCNC: 3.5 MMOL/L (ref 3.5–5.1)
PROT SERPL-MCNC: 7.2 G/DL (ref 6–8.4)
RBC # BLD AUTO: 4.92 M/UL (ref 4.6–6.2)
SODIUM SERPL-SCNC: 139 MMOL/L (ref 136–145)
T4 FREE SERPL-MCNC: 0.9 NG/DL (ref 0.71–1.51)
TRIGL SERPL-MCNC: 84 MG/DL (ref 30–150)
TSH SERPL DL<=0.005 MIU/L-ACNC: 3.59 UIU/ML (ref 0.4–4)
URATE SERPL-MCNC: 5.6 MG/DL (ref 3.4–7)
WBC # BLD AUTO: 7.7 K/UL (ref 3.9–12.7)

## 2022-01-12 PROCEDURE — 1125F AMNT PAIN NOTED PAIN PRSNT: CPT | Mod: HCNC,CPTII,S$GLB, | Performed by: INTERNAL MEDICINE

## 2022-01-12 PROCEDURE — 80053 COMPREHEN METABOLIC PANEL: CPT | Mod: HCNC | Performed by: INTERNAL MEDICINE

## 2022-01-12 PROCEDURE — 1159F MED LIST DOCD IN RCRD: CPT | Mod: HCNC,CPTII,S$GLB, | Performed by: INTERNAL MEDICINE

## 2022-01-12 PROCEDURE — 99499 RISK ADDL DX/OHS AUDIT: ICD-10-PCS | Mod: S$GLB,,, | Performed by: INTERNAL MEDICINE

## 2022-01-12 PROCEDURE — 1159F PR MEDICATION LIST DOCUMENTED IN MEDICAL RECORD: ICD-10-PCS | Mod: HCNC,CPTII,S$GLB, | Performed by: INTERNAL MEDICINE

## 2022-01-12 PROCEDURE — 1101F PT FALLS ASSESS-DOCD LE1/YR: CPT | Mod: HCNC,CPTII,S$GLB, | Performed by: INTERNAL MEDICINE

## 2022-01-12 PROCEDURE — 99214 PR OFFICE/OUTPT VISIT, EST, LEVL IV, 30-39 MIN: ICD-10-PCS | Mod: 25,HCNC,S$GLB, | Performed by: INTERNAL MEDICINE

## 2022-01-12 PROCEDURE — 3288F FALL RISK ASSESSMENT DOCD: CPT | Mod: HCNC,CPTII,S$GLB, | Performed by: INTERNAL MEDICINE

## 2022-01-12 PROCEDURE — 99999 PR PBB SHADOW E&M-EST. PATIENT-LVL III: CPT | Mod: PBBFAC,HCNC,, | Performed by: INTERNAL MEDICINE

## 2022-01-12 PROCEDURE — 3288F PR FALLS RISK ASSESSMENT DOCUMENTED: ICD-10-PCS | Mod: HCNC,CPTII,S$GLB, | Performed by: INTERNAL MEDICINE

## 2022-01-12 PROCEDURE — 85025 COMPLETE CBC W/AUTO DIFF WBC: CPT | Mod: HCNC | Performed by: INTERNAL MEDICINE

## 2022-01-12 PROCEDURE — 3075F SYST BP GE 130 - 139MM HG: CPT | Mod: HCNC,CPTII,S$GLB, | Performed by: INTERNAL MEDICINE

## 2022-01-12 PROCEDURE — 99397 PR PREVENTIVE VISIT,EST,65 & OVER: ICD-10-PCS | Mod: HCNC,S$GLB,, | Performed by: INTERNAL MEDICINE

## 2022-01-12 PROCEDURE — 3008F PR BODY MASS INDEX (BMI) DOCUMENTED: ICD-10-PCS | Mod: HCNC,CPTII,S$GLB, | Performed by: INTERNAL MEDICINE

## 2022-01-12 PROCEDURE — 1125F PR PAIN SEVERITY QUANTIFIED, PAIN PRESENT: ICD-10-PCS | Mod: HCNC,CPTII,S$GLB, | Performed by: INTERNAL MEDICINE

## 2022-01-12 PROCEDURE — 1101F PR PT FALLS ASSESS DOC 0-1 FALLS W/OUT INJ PAST YR: ICD-10-PCS | Mod: HCNC,CPTII,S$GLB, | Performed by: INTERNAL MEDICINE

## 2022-01-12 PROCEDURE — 80061 LIPID PANEL: CPT | Mod: HCNC | Performed by: INTERNAL MEDICINE

## 2022-01-12 PROCEDURE — 99397 PER PM REEVAL EST PAT 65+ YR: CPT | Mod: HCNC,S$GLB,, | Performed by: INTERNAL MEDICINE

## 2022-01-12 PROCEDURE — 3078F DIAST BP <80 MM HG: CPT | Mod: HCNC,CPTII,S$GLB, | Performed by: INTERNAL MEDICINE

## 2022-01-12 PROCEDURE — 83036 HEMOGLOBIN GLYCOSYLATED A1C: CPT | Mod: HCNC | Performed by: INTERNAL MEDICINE

## 2022-01-12 PROCEDURE — 3008F BODY MASS INDEX DOCD: CPT | Mod: HCNC,CPTII,S$GLB, | Performed by: INTERNAL MEDICINE

## 2022-01-12 PROCEDURE — 99999 PR PBB SHADOW E&M-EST. PATIENT-LVL III: ICD-10-PCS | Mod: PBBFAC,HCNC,, | Performed by: INTERNAL MEDICINE

## 2022-01-12 PROCEDURE — 84550 ASSAY OF BLOOD/URIC ACID: CPT | Mod: HCNC | Performed by: INTERNAL MEDICINE

## 2022-01-12 PROCEDURE — 3078F PR MOST RECENT DIASTOLIC BLOOD PRESSURE < 80 MM HG: ICD-10-PCS | Mod: HCNC,CPTII,S$GLB, | Performed by: INTERNAL MEDICINE

## 2022-01-12 PROCEDURE — 86787 VARICELLA-ZOSTER ANTIBODY: CPT | Mod: HCNC | Performed by: INTERNAL MEDICINE

## 2022-01-12 PROCEDURE — 84443 ASSAY THYROID STIM HORMONE: CPT | Mod: HCNC | Performed by: INTERNAL MEDICINE

## 2022-01-12 PROCEDURE — 84153 ASSAY OF PSA TOTAL: CPT | Mod: HCNC | Performed by: INTERNAL MEDICINE

## 2022-01-12 PROCEDURE — 84439 ASSAY OF FREE THYROXINE: CPT | Mod: HCNC | Performed by: INTERNAL MEDICINE

## 2022-01-12 PROCEDURE — 99499 UNLISTED E&M SERVICE: CPT | Mod: S$GLB,,, | Performed by: INTERNAL MEDICINE

## 2022-01-12 PROCEDURE — 3075F PR MOST RECENT SYSTOLIC BLOOD PRESS GE 130-139MM HG: ICD-10-PCS | Mod: HCNC,CPTII,S$GLB, | Performed by: INTERNAL MEDICINE

## 2022-01-12 PROCEDURE — 99214 OFFICE O/P EST MOD 30 MIN: CPT | Mod: 25,HCNC,S$GLB, | Performed by: INTERNAL MEDICINE

## 2022-01-12 PROCEDURE — 36415 COLL VENOUS BLD VENIPUNCTURE: CPT | Mod: HCNC,PO | Performed by: INTERNAL MEDICINE

## 2022-01-12 RX ORDER — INDOMETHACIN 25 MG/1
CAPSULE ORAL
Qty: 90 CAPSULE | Refills: 4 | Status: SHIPPED | OUTPATIENT
Start: 2022-01-12 | End: 2022-05-17

## 2022-01-12 RX ORDER — ALLOPURINOL 100 MG/1
100 TABLET ORAL DAILY
Qty: 90 TABLET | Refills: 4 | Status: SHIPPED | OUTPATIENT
Start: 2022-01-12 | End: 2022-05-16

## 2022-01-12 RX ORDER — AMLODIPINE BESYLATE 5 MG/1
TABLET ORAL
Qty: 90 TABLET | Refills: 4 | Status: SHIPPED | OUTPATIENT
Start: 2022-01-12 | End: 2023-02-15

## 2022-01-12 RX ORDER — MONTELUKAST SODIUM 10 MG/1
10 TABLET ORAL DAILY
Qty: 90 TABLET | Refills: 4 | Status: SHIPPED | OUTPATIENT
Start: 2022-01-12 | End: 2023-05-02

## 2022-01-12 RX ORDER — PANTOPRAZOLE SODIUM 40 MG/1
40 TABLET, DELAYED RELEASE ORAL
Qty: 90 TABLET | Refills: 4 | Status: SHIPPED | OUTPATIENT
Start: 2022-01-12 | End: 2023-02-15

## 2022-01-12 RX ORDER — FLUTICASONE PROPIONATE 50 MCG
2 SPRAY, SUSPENSION (ML) NASAL DAILY
Qty: 30 G | Refills: 12 | Status: SHIPPED | OUTPATIENT
Start: 2022-01-12 | End: 2022-02-11

## 2022-01-12 RX ORDER — IRBESARTAN 300 MG/1
300 TABLET ORAL NIGHTLY
Qty: 90 TABLET | Refills: 12 | Status: SHIPPED | OUTPATIENT
Start: 2022-01-12 | End: 2023-02-15
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
## 2022-01-14 LAB
VARICELLA INTERPRETATION: POSITIVE
VARICELLA ZOSTER IGG: 2.81 ISR (ref 0–0.9)

## 2022-05-15 NOTE — TELEPHONE ENCOUNTER
No new care gaps identified.  [facility] Embedded Care Gaps. Reference number: [id number]. 5/15/2022   11:24:24 AM CDT

## 2022-05-16 RX ORDER — ALLOPURINOL 100 MG/1
100 TABLET ORAL DAILY
Qty: 90 TABLET | Refills: 2 | Status: SHIPPED | OUTPATIENT
Start: 2022-05-16 | End: 2023-07-12

## 2022-05-16 NOTE — TELEPHONE ENCOUNTER
Refill Routing Note   Medication(s) are not appropriate for processing by [facility] for the following reason(s):      - Outside of protocol    [facility] action(s):  Route  Approve       Medication Therapy Plan: ROUTE indomethacin  (OOP)  APPROVE allopurinol  Medication reconciliation completed: No     Appointments  past 12m or future 3m with PCP    Date Provider   Last Visit   1/12/2022 Karel Contreras MD   Next Visit   Visit date not found Karel Contreras MD   ED visits in past 90 days: 0        Note composed:3:56 PM 05/16/2022

## 2022-05-17 RX ORDER — INDOMETHACIN 25 MG/1
CAPSULE ORAL
Qty: 60 CAPSULE | Refills: 0 | Status: SHIPPED | OUTPATIENT
Start: 2022-05-17 | End: 2022-11-17

## 2022-09-07 DIAGNOSIS — E11.9 TYPE 2 DIABETES MELLITUS WITHOUT COMPLICATION: ICD-10-CM

## 2022-09-12 ENCOUNTER — PATIENT MESSAGE (OUTPATIENT)
Dept: ADMINISTRATIVE | Facility: HOSPITAL | Age: 67
End: 2022-09-12
Payer: MEDICARE

## 2022-10-05 LAB
LEFT EYE DM RETINOPATHY: NEGATIVE
RIGHT EYE DM RETINOPATHY: NEGATIVE

## 2022-10-10 ENCOUNTER — PATIENT MESSAGE (OUTPATIENT)
Dept: ADMINISTRATIVE | Facility: HOSPITAL | Age: 67
End: 2022-10-10
Payer: MEDICARE

## 2022-10-11 DIAGNOSIS — E11.9 TYPE 2 DIABETES MELLITUS WITHOUT COMPLICATION, UNSPECIFIED WHETHER LONG TERM INSULIN USE: ICD-10-CM

## 2022-10-14 ENCOUNTER — PATIENT OUTREACH (OUTPATIENT)
Dept: ADMINISTRATIVE | Facility: HOSPITAL | Age: 67
End: 2022-10-14
Payer: MEDICARE

## 2022-11-10 LAB
COMMENTS: ABNORMAL
EST. AVERAGE GLUCOSE BLD GHB EST-MCNC: 137 MG/DL
HBA1C MFR BLD: 6.4 % (ref 4.8–5.6)

## 2022-12-06 ENCOUNTER — TELEPHONE (OUTPATIENT)
Dept: FAMILY MEDICINE | Facility: CLINIC | Age: 67
End: 2022-12-06
Payer: MEDICARE

## 2022-12-06 DIAGNOSIS — E78.5 HYPERLIPIDEMIA, UNSPECIFIED HYPERLIPIDEMIA TYPE: ICD-10-CM

## 2022-12-06 DIAGNOSIS — Z12.5 PROSTATE CANCER SCREENING ENCOUNTER, OPTIONS AND RISKS DISCUSSED: Primary | ICD-10-CM

## 2022-12-06 DIAGNOSIS — R73.9 HYPERGLYCEMIA: ICD-10-CM

## 2022-12-06 DIAGNOSIS — Z00.00 ROUTINE MEDICAL EXAM: ICD-10-CM

## 2022-12-06 DIAGNOSIS — M1A.9XX0 CHRONIC GOUT WITHOUT TOPHUS, UNSPECIFIED CAUSE, UNSPECIFIED SITE: ICD-10-CM

## 2022-12-06 DIAGNOSIS — E55.9 VITAMIN D DEFICIENCY DISEASE: ICD-10-CM

## 2022-12-06 NOTE — TELEPHONE ENCOUNTER
----- Message from Oleg Freeman sent at 12/6/2022 10:53 AM CST -----  Regarding: Orders  Contact: GERTRUDIS ZIMMERMAN [[id number]]  Caller is requesting to schedule their Lab appointment prior to annual appointment.  Order is not listed in EPIC.  Please enter order and contact patient to schedule.    Name of Caller:    Preferred Date and Time of Labs: 1/11 for first thing in morning    Date of EPP Appointment: 1/18    Where would they like the lab performed? [facility]    Would the patient rather a call back or a response via [facility]?  call    Best Call Back Number: [phone]    Additional Information:

## 2022-12-07 ENCOUNTER — TELEPHONE (OUTPATIENT)
Dept: FAMILY MEDICINE | Facility: CLINIC | Age: 67
End: 2022-12-07
Payer: MEDICARE

## 2022-12-07 NOTE — TELEPHONE ENCOUNTER
Patient wanted to know what time his lab appointment was for and where he had to go. Informed patient

## 2022-12-07 NOTE — TELEPHONE ENCOUNTER
----- Message from Mirta Singh sent at 12/7/2022  9:55 AM CST -----  Type: Patient Call Back    Who called:self    What is the request in detail: pt would like to speak to someone int he office about lab appt. Please call    Can the clinic reply by [facility]?    Would the patient rather a call back or a response via [facility]? call    Best call back number:[phone] (home)

## 2023-01-11 ENCOUNTER — LAB VISIT (OUTPATIENT)
Dept: LAB | Facility: HOSPITAL | Age: 68
End: 2023-01-11
Attending: NURSE PRACTITIONER
Payer: MEDICARE

## 2023-01-11 DIAGNOSIS — Z12.5 PROSTATE CANCER SCREENING ENCOUNTER, OPTIONS AND RISKS DISCUSSED: ICD-10-CM

## 2023-01-11 DIAGNOSIS — E55.9 VITAMIN D DEFICIENCY DISEASE: ICD-10-CM

## 2023-01-11 DIAGNOSIS — M1A.9XX0 CHRONIC GOUT WITHOUT TOPHUS, UNSPECIFIED CAUSE, UNSPECIFIED SITE: ICD-10-CM

## 2023-01-11 DIAGNOSIS — Z00.00 ROUTINE MEDICAL EXAM: ICD-10-CM

## 2023-01-11 DIAGNOSIS — E78.5 HYPERLIPIDEMIA, UNSPECIFIED HYPERLIPIDEMIA TYPE: ICD-10-CM

## 2023-01-11 DIAGNOSIS — R73.9 HYPERGLYCEMIA: ICD-10-CM

## 2023-01-11 LAB
25(OH)D3+25(OH)D2 SERPL-MCNC: 45 NG/ML (ref 30–96)
ALBUMIN SERPL BCP-MCNC: 4.5 G/DL (ref 3.5–5.2)
ALP SERPL-CCNC: 70 U/L (ref 55–135)
ALT SERPL W/O P-5'-P-CCNC: 23 U/L (ref 10–44)
ANION GAP SERPL CALC-SCNC: 11 MMOL/L (ref 8–16)
AST SERPL-CCNC: 22 U/L (ref 10–40)
BASOPHILS # BLD AUTO: 0.07 K/UL (ref 0–0.2)
BASOPHILS NFR BLD: 0.9 % (ref 0–1.9)
BILIRUB SERPL-MCNC: 0.8 MG/DL (ref 0.1–1)
BUN SERPL-MCNC: 19 MG/DL (ref 8–23)
CALCIUM SERPL-MCNC: 9.6 MG/DL (ref 8.7–10.5)
CHLORIDE SERPL-SCNC: 101 MMOL/L (ref 95–110)
CHOLEST SERPL-MCNC: 184 MG/DL (ref 120–199)
CHOLEST/HDLC SERPL: 3.5 {RATIO} (ref 2–5)
CO2 SERPL-SCNC: 26 MMOL/L (ref 23–29)
COMPLEXED PSA SERPL-MCNC: 1.6 NG/ML (ref 0–4)
CREAT SERPL-MCNC: 0.9 MG/DL (ref 0.5–1.4)
DIFFERENTIAL METHOD: ABNORMAL
EOSINOPHIL # BLD AUTO: 0.3 K/UL (ref 0–0.5)
EOSINOPHIL NFR BLD: 4 % (ref 0–8)
ERYTHROCYTE [DISTWIDTH] IN BLOOD BY AUTOMATED COUNT: 14.7 % (ref 11.5–14.5)
EST. GFR  (NO RACE VARIABLE): >60 ML/MIN/1.73 M^2
ESTIMATED AVG GLUCOSE: 123 MG/DL (ref 68–131)
GLUCOSE SERPL-MCNC: 107 MG/DL (ref 70–110)
HBA1C MFR BLD: 5.9 % (ref 4.5–6.2)
HCT VFR BLD AUTO: 48.5 % (ref 40–54)
HDLC SERPL-MCNC: 53 MG/DL (ref 40–75)
HDLC SERPL: 28.8 % (ref 20–50)
HGB BLD-MCNC: 16 G/DL (ref 14–18)
IMM GRANULOCYTES # BLD AUTO: 0.03 K/UL (ref 0–0.04)
IMM GRANULOCYTES NFR BLD AUTO: 0.4 % (ref 0–0.5)
LDLC SERPL CALC-MCNC: 114.4 MG/DL (ref 63–159)
LYMPHOCYTES # BLD AUTO: 2.4 K/UL (ref 1–4.8)
LYMPHOCYTES NFR BLD: 31.5 % (ref 18–48)
MCH RBC QN AUTO: 29.7 PG (ref 27–31)
MCHC RBC AUTO-ENTMCNC: 33 G/DL (ref 32–36)
MCV RBC AUTO: 90 FL (ref 82–98)
MONOCYTES # BLD AUTO: 0.8 K/UL (ref 0.3–1)
MONOCYTES NFR BLD: 10.1 % (ref 4–15)
NEUTROPHILS # BLD AUTO: 4 K/UL (ref 1.8–7.7)
NEUTROPHILS NFR BLD: 53.1 % (ref 38–73)
NONHDLC SERPL-MCNC: 131 MG/DL
NRBC BLD-RTO: 0 /100 WBC
PLATELET # BLD AUTO: 233 K/UL (ref 150–450)
PMV BLD AUTO: 10.9 FL (ref 9.2–12.9)
POTASSIUM SERPL-SCNC: 4.4 MMOL/L (ref 3.5–5.1)
PROT SERPL-MCNC: 7.9 G/DL (ref 6–8.4)
RBC # BLD AUTO: 5.38 M/UL (ref 4.6–6.2)
SODIUM SERPL-SCNC: 138 MMOL/L (ref 136–145)
T4 FREE SERPL-MCNC: 0.89 NG/DL (ref 0.71–1.51)
TRIGL SERPL-MCNC: 83 MG/DL (ref 30–150)
TSH SERPL DL<=0.005 MIU/L-ACNC: 6.3 UIU/ML (ref 0.34–5.6)
URATE SERPL-MCNC: 6 MG/DL (ref 3.4–7)
WBC # BLD AUTO: 7.45 K/UL (ref 3.9–12.7)

## 2023-01-11 PROCEDURE — 84443 ASSAY THYROID STIM HORMONE: CPT | Performed by: NURSE PRACTITIONER

## 2023-01-11 PROCEDURE — 36415 COLL VENOUS BLD VENIPUNCTURE: CPT | Performed by: NURSE PRACTITIONER

## 2023-01-11 PROCEDURE — 80053 COMPREHEN METABOLIC PANEL: CPT | Performed by: NURSE PRACTITIONER

## 2023-01-11 PROCEDURE — 82306 VITAMIN D 25 HYDROXY: CPT | Performed by: NURSE PRACTITIONER

## 2023-01-11 PROCEDURE — 84550 ASSAY OF BLOOD/URIC ACID: CPT | Performed by: NURSE PRACTITIONER

## 2023-01-11 PROCEDURE — 84439 ASSAY OF FREE THYROXINE: CPT | Performed by: NURSE PRACTITIONER

## 2023-01-11 PROCEDURE — 84153 ASSAY OF PSA TOTAL: CPT | Performed by: NURSE PRACTITIONER

## 2023-01-11 PROCEDURE — 83036 HEMOGLOBIN GLYCOSYLATED A1C: CPT | Performed by: NURSE PRACTITIONER

## 2023-01-11 PROCEDURE — 85025 COMPLETE CBC W/AUTO DIFF WBC: CPT | Performed by: NURSE PRACTITIONER

## 2023-01-11 PROCEDURE — 80061 LIPID PANEL: CPT | Performed by: NURSE PRACTITIONER

## 2023-01-17 ENCOUNTER — TELEPHONE (OUTPATIENT)
Dept: FAMILY MEDICINE | Facility: CLINIC | Age: 68
End: 2023-01-17
Payer: MEDICARE

## 2023-01-18 ENCOUNTER — OFFICE VISIT (OUTPATIENT)
Dept: FAMILY MEDICINE | Facility: CLINIC | Age: 68
End: 2023-01-18
Payer: MEDICARE

## 2023-01-18 VITALS
DIASTOLIC BLOOD PRESSURE: 76 MMHG | HEART RATE: 64 BPM | HEIGHT: 65 IN | TEMPERATURE: 98 F | WEIGHT: 172.81 LBS | BODY MASS INDEX: 28.79 KG/M2 | OXYGEN SATURATION: 96 % | SYSTOLIC BLOOD PRESSURE: 108 MMHG

## 2023-01-18 DIAGNOSIS — M1A.9XX0 CHRONIC GOUT WITHOUT TOPHUS, UNSPECIFIED CAUSE, UNSPECIFIED SITE: ICD-10-CM

## 2023-01-18 DIAGNOSIS — R79.89 ABNORMAL LIVER FUNCTION TESTS: ICD-10-CM

## 2023-01-18 DIAGNOSIS — Z86.010 HISTORY OF COLONIC POLYPS: ICD-10-CM

## 2023-01-18 DIAGNOSIS — E11.65 UNCONTROLLED TYPE 2 DIABETES MELLITUS WITH HYPERGLYCEMIA: ICD-10-CM

## 2023-01-18 DIAGNOSIS — K21.9 GASTROESOPHAGEAL REFLUX DISEASE, UNSPECIFIED WHETHER ESOPHAGITIS PRESENT: ICD-10-CM

## 2023-01-18 DIAGNOSIS — E78.6 HDL LIPOPROTEIN DEFICIENCY: ICD-10-CM

## 2023-01-18 DIAGNOSIS — E55.9 VITAMIN D DEFICIENCY DISEASE: ICD-10-CM

## 2023-01-18 DIAGNOSIS — J44.9 CHRONIC OBSTRUCTIVE PULMONARY DISEASE, UNSPECIFIED COPD TYPE: ICD-10-CM

## 2023-01-18 DIAGNOSIS — N40.0 BENIGN PROSTATIC HYPERPLASIA, UNSPECIFIED WHETHER LOWER URINARY TRACT SYMPTOMS PRESENT: ICD-10-CM

## 2023-01-18 DIAGNOSIS — I10 ESSENTIAL HYPERTENSION: ICD-10-CM

## 2023-01-18 DIAGNOSIS — E78.5 HYPERLIPIDEMIA, UNSPECIFIED HYPERLIPIDEMIA TYPE: ICD-10-CM

## 2023-01-18 DIAGNOSIS — Z12.5 SCREENING FOR PROSTATE CANCER: ICD-10-CM

## 2023-01-18 DIAGNOSIS — Z00.00 ROUTINE MEDICAL EXAM: Primary | ICD-10-CM

## 2023-01-18 DIAGNOSIS — R79.89 ABNORMAL THYROID BLOOD TEST: ICD-10-CM

## 2023-01-18 DIAGNOSIS — G47.33 OSA (OBSTRUCTIVE SLEEP APNEA): ICD-10-CM

## 2023-01-18 PROCEDURE — 1101F PT FALLS ASSESS-DOCD LE1/YR: CPT | Mod: HCNC,CPTII,S$GLB, | Performed by: INTERNAL MEDICINE

## 2023-01-18 PROCEDURE — 3061F NEG MICROALBUMINURIA REV: CPT | Mod: HCNC,CPTII,S$GLB, | Performed by: INTERNAL MEDICINE

## 2023-01-18 PROCEDURE — 3288F PR FALLS RISK ASSESSMENT DOCUMENTED: ICD-10-PCS | Mod: HCNC,CPTII,S$GLB, | Performed by: INTERNAL MEDICINE

## 2023-01-18 PROCEDURE — 3008F PR BODY MASS INDEX (BMI) DOCUMENTED: ICD-10-PCS | Mod: HCNC,CPTII,S$GLB, | Performed by: INTERNAL MEDICINE

## 2023-01-18 PROCEDURE — 1125F AMNT PAIN NOTED PAIN PRSNT: CPT | Mod: HCNC,CPTII,S$GLB, | Performed by: INTERNAL MEDICINE

## 2023-01-18 PROCEDURE — 1101F PR PT FALLS ASSESS DOC 0-1 FALLS W/OUT INJ PAST YR: ICD-10-PCS | Mod: HCNC,CPTII,S$GLB, | Performed by: INTERNAL MEDICINE

## 2023-01-18 PROCEDURE — 99397 PER PM REEVAL EST PAT 65+ YR: CPT | Mod: HCNC,S$GLB,, | Performed by: INTERNAL MEDICINE

## 2023-01-18 PROCEDURE — 3288F FALL RISK ASSESSMENT DOCD: CPT | Mod: HCNC,CPTII,S$GLB, | Performed by: INTERNAL MEDICINE

## 2023-01-18 PROCEDURE — 99499 UNLISTED E&M SERVICE: CPT | Mod: HCNC,S$GLB,, | Performed by: INTERNAL MEDICINE

## 2023-01-18 PROCEDURE — 3061F PR NEG MICROALBUMINURIA RESULT DOCUMENTED/REVIEW: ICD-10-PCS | Mod: HCNC,CPTII,S$GLB, | Performed by: INTERNAL MEDICINE

## 2023-01-18 PROCEDURE — 3078F DIAST BP <80 MM HG: CPT | Mod: HCNC,CPTII,S$GLB, | Performed by: INTERNAL MEDICINE

## 2023-01-18 PROCEDURE — 3066F NEPHROPATHY DOC TX: CPT | Mod: HCNC,CPTII,S$GLB, | Performed by: INTERNAL MEDICINE

## 2023-01-18 PROCEDURE — 3066F PR DOCUMENTATION OF TREATMENT FOR NEPHROPATHY: ICD-10-PCS | Mod: HCNC,CPTII,S$GLB, | Performed by: INTERNAL MEDICINE

## 2023-01-18 PROCEDURE — 3074F SYST BP LT 130 MM HG: CPT | Mod: HCNC,CPTII,S$GLB, | Performed by: INTERNAL MEDICINE

## 2023-01-18 PROCEDURE — 3074F PR MOST RECENT SYSTOLIC BLOOD PRESSURE < 130 MM HG: ICD-10-PCS | Mod: HCNC,CPTII,S$GLB, | Performed by: INTERNAL MEDICINE

## 2023-01-18 PROCEDURE — 99999 PR PBB SHADOW E&M-EST. PATIENT-LVL III: ICD-10-PCS | Mod: PBBFAC,HCNC,, | Performed by: INTERNAL MEDICINE

## 2023-01-18 PROCEDURE — 1125F PR PAIN SEVERITY QUANTIFIED, PAIN PRESENT: ICD-10-PCS | Mod: HCNC,CPTII,S$GLB, | Performed by: INTERNAL MEDICINE

## 2023-01-18 PROCEDURE — 99397 PR PREVENTIVE VISIT,EST,65 & OVER: ICD-10-PCS | Mod: HCNC,S$GLB,, | Performed by: INTERNAL MEDICINE

## 2023-01-18 PROCEDURE — 99999 PR PBB SHADOW E&M-EST. PATIENT-LVL III: CPT | Mod: PBBFAC,HCNC,, | Performed by: INTERNAL MEDICINE

## 2023-01-18 PROCEDURE — 3044F PR MOST RECENT HEMOGLOBIN A1C LEVEL <7.0%: ICD-10-PCS | Mod: HCNC,CPTII,S$GLB, | Performed by: INTERNAL MEDICINE

## 2023-01-18 PROCEDURE — 3044F HG A1C LEVEL LT 7.0%: CPT | Mod: HCNC,CPTII,S$GLB, | Performed by: INTERNAL MEDICINE

## 2023-01-18 PROCEDURE — 3008F BODY MASS INDEX DOCD: CPT | Mod: HCNC,CPTII,S$GLB, | Performed by: INTERNAL MEDICINE

## 2023-01-18 PROCEDURE — 99499 RISK ADDL DX/OHS AUDIT: ICD-10-PCS | Mod: HCNC,S$GLB,, | Performed by: INTERNAL MEDICINE

## 2023-01-18 PROCEDURE — 3078F PR MOST RECENT DIASTOLIC BLOOD PRESSURE < 80 MM HG: ICD-10-PCS | Mod: HCNC,CPTII,S$GLB, | Performed by: INTERNAL MEDICINE

## 2023-01-18 RX ORDER — FLUTICASONE PROPIONATE 50 MCG
1 SPRAY, SUSPENSION (ML) NASAL
COMMUNITY
Start: 2022-11-14 | End: 2024-03-04

## 2023-01-18 RX ORDER — CLOTRIMAZOLE AND BETAMETHASONE DIPROPIONATE 10; .64 MG/G; MG/G
CREAM TOPICAL 2 TIMES DAILY
Qty: 45 G | Refills: 3 | Status: SHIPPED | OUTPATIENT
Start: 2023-01-18 | End: 2024-03-04 | Stop reason: SDUPTHER
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
## 2023-01-31 ENCOUNTER — PATIENT MESSAGE (OUTPATIENT)
Dept: GASTROENTEROLOGY | Facility: CLINIC | Age: 68
End: 2023-01-31
Payer: MEDICARE

## 2023-01-31 DIAGNOSIS — Z86.010 HISTORY OF COLON POLYPS: Primary | ICD-10-CM

## 2023-02-07 DIAGNOSIS — Z00.00 ENCOUNTER FOR MEDICARE ANNUAL WELLNESS EXAM: ICD-10-CM

## 2023-02-09 DIAGNOSIS — Z00.00 ENCOUNTER FOR MEDICARE ANNUAL WELLNESS EXAM: ICD-10-CM

## 2023-02-15 RX ORDER — AMLODIPINE BESYLATE 5 MG/1
TABLET ORAL
Qty: 90 TABLET | Refills: 3 | Status: SHIPPED | OUTPATIENT
Start: 2023-02-15 | End: 2024-03-04 | Stop reason: SDUPTHER

## 2023-02-15 RX ORDER — IRBESARTAN 300 MG/1
TABLET ORAL
Qty: 90 TABLET | Refills: 3 | Status: SHIPPED | OUTPATIENT
Start: 2023-02-15 | End: 2024-03-04 | Stop reason: SDUPTHER

## 2023-02-15 RX ORDER — PANTOPRAZOLE SODIUM 40 MG/1
TABLET, DELAYED RELEASE ORAL
Qty: 90 TABLET | Refills: 3 | Status: SHIPPED | OUTPATIENT
Start: 2023-02-15 | End: 2024-03-04 | Stop reason: SDUPTHER

## 2023-02-15 NOTE — TELEPHONE ENCOUNTER
No new care gaps identified.  [facility] Embedded Care Gaps. Reference number: [id number]. 2/15/2023   1:13:48 PM CST

## 2023-02-15 NOTE — TELEPHONE ENCOUNTER
Refill Decision Note   Preston Kirby  is requesting a refill authorization.  Brief Assessment and Rationale for Refill:  Approve     Medication Therapy Plan:       Medication Reconciliation Completed: No   Comments:     No Care Gaps recommended.     Note composed:1:42 PM 02/15/2023

## 2023-02-16 ENCOUNTER — PES CALL (OUTPATIENT)
Dept: ADMINISTRATIVE | Facility: OTHER | Age: 68
End: 2023-02-16
Payer: MEDICARE

## 2023-04-28 ENCOUNTER — ANESTHESIA EVENT (OUTPATIENT)
Dept: ENDOSCOPY | Facility: HOSPITAL | Age: 68
End: 2023-04-28
Payer: MEDICARE

## 2023-04-28 ENCOUNTER — ANESTHESIA (OUTPATIENT)
Dept: ENDOSCOPY | Facility: HOSPITAL | Age: 68
End: 2023-04-28
Payer: MEDICARE

## 2023-04-28 ENCOUNTER — HOSPITAL ENCOUNTER (OUTPATIENT)
Facility: HOSPITAL | Age: 68
Discharge: HOME | End: 2023-04-28
Attending: INTERNAL MEDICINE | Admitting: INTERNAL MEDICINE
Payer: MEDICARE

## 2023-04-28 DIAGNOSIS — K63.5 POLYP OF COLON, UNSPECIFIED PART OF COLON, UNSPECIFIED TYPE: ICD-10-CM

## 2023-04-28 DIAGNOSIS — K57.90 DIVERTICULOSIS: ICD-10-CM

## 2023-04-28 DIAGNOSIS — K64.8 INTERNAL HEMORRHOIDS: Primary | ICD-10-CM

## 2023-04-28 DIAGNOSIS — Z86.010 HISTORY OF COLON POLYPS: ICD-10-CM

## 2023-04-28 PROCEDURE — 88305 TISSUE EXAM BY PATHOLOGIST: ICD-10-PCS | Mod: 26,HCNC,, | Performed by: STUDENT IN AN ORGANIZED HEALTH CARE EDUCATION/TRAINING PROGRAM

## 2023-04-28 PROCEDURE — 45380 COLONOSCOPY AND BIOPSY: CPT | Mod: PT,59,HCNC, | Performed by: INTERNAL MEDICINE

## 2023-04-28 PROCEDURE — D9220A PRA ANESTHESIA: ICD-10-PCS | Mod: PT,ANES,, | Performed by: ANESTHESIOLOGY

## 2023-04-28 PROCEDURE — 27201089 HC SNARE, DISP (ANY): Mod: HCNC | Performed by: INTERNAL MEDICINE

## 2023-04-28 PROCEDURE — 45385 COLONOSCOPY W/LESION REMOVAL: CPT | Mod: PT,HCNC,, | Performed by: INTERNAL MEDICINE

## 2023-04-28 PROCEDURE — 25000003 PHARM REV CODE 250: Mod: HCNC | Performed by: NURSE ANESTHETIST, CERTIFIED REGISTERED

## 2023-04-28 PROCEDURE — 63600175 PHARM REV CODE 636 W HCPCS: Mod: HCNC | Performed by: NURSE ANESTHETIST, CERTIFIED REGISTERED

## 2023-04-28 PROCEDURE — D9220A PRA ANESTHESIA: Mod: PT,CRNA,, | Performed by: NURSE ANESTHETIST, CERTIFIED REGISTERED

## 2023-04-28 PROCEDURE — D9220A PRA ANESTHESIA: Mod: PT,ANES,, | Performed by: ANESTHESIOLOGY

## 2023-04-28 PROCEDURE — 45380 PR COLONOSCOPY,BIOPSY: ICD-10-PCS | Mod: PT,59,HCNC, | Performed by: INTERNAL MEDICINE

## 2023-04-28 PROCEDURE — 45380 COLONOSCOPY AND BIOPSY: CPT | Mod: PT,59,HCNC | Performed by: INTERNAL MEDICINE

## 2023-04-28 PROCEDURE — 45385 COLONOSCOPY W/LESION REMOVAL: CPT | Mod: PT,HCNC | Performed by: INTERNAL MEDICINE

## 2023-04-28 PROCEDURE — 45385 PR COLONOSCOPY,REMV LESN,SNARE: ICD-10-PCS | Mod: PT,HCNC,, | Performed by: INTERNAL MEDICINE

## 2023-04-28 PROCEDURE — 88305 TISSUE EXAM BY PATHOLOGIST: CPT | Mod: 26,HCNC,, | Performed by: STUDENT IN AN ORGANIZED HEALTH CARE EDUCATION/TRAINING PROGRAM

## 2023-04-28 PROCEDURE — 37000009 HC ANESTHESIA EA ADD 15 MINS: Mod: HCNC | Performed by: INTERNAL MEDICINE

## 2023-04-28 PROCEDURE — 27201012 HC FORCEPS, HOT/COLD, DISP: Mod: HCNC | Performed by: INTERNAL MEDICINE

## 2023-04-28 PROCEDURE — D9220A PRA ANESTHESIA: ICD-10-PCS | Mod: PT,CRNA,, | Performed by: NURSE ANESTHETIST, CERTIFIED REGISTERED

## 2023-04-28 PROCEDURE — 88305 TISSUE EXAM BY PATHOLOGIST: CPT | Mod: HCNC | Performed by: STUDENT IN AN ORGANIZED HEALTH CARE EDUCATION/TRAINING PROGRAM

## 2023-04-28 PROCEDURE — 37000008 HC ANESTHESIA 1ST 15 MINUTES: Mod: HCNC | Performed by: INTERNAL MEDICINE

## 2023-04-28 RX ORDER — PROPOFOL 10 MG/ML
VIAL (ML) INTRAVENOUS
Status: DISCONTINUED | OUTPATIENT
Start: 2023-04-28 | End: 2023-04-28

## 2023-04-28 RX ORDER — LIDOCAINE HYDROCHLORIDE 20 MG/ML
INJECTION INTRAVENOUS
Status: DISCONTINUED | OUTPATIENT
Start: 2023-04-28 | End: 2023-04-28

## 2023-04-28 RX ORDER — SODIUM CHLORIDE 9 MG/ML
INJECTION, SOLUTION INTRAVENOUS CONTINUOUS
Status: DISCONTINUED | OUTPATIENT
Start: 2023-04-28 | End: 2023-04-28 | Stop reason: HOSPADM

## 2023-04-28 RX ADMIN — PROPOFOL 50 MG: 10 INJECTION, EMULSION INTRAVENOUS at 09:04

## 2023-04-28 RX ADMIN — LIDOCAINE HYDROCHLORIDE 100 MG: 20 INJECTION, SOLUTION INTRAVENOUS at 09:04

## 2023-04-28 RX ADMIN — PROPOFOL 100 MG: 10 INJECTION, EMULSION INTRAVENOUS at 09:04

## 2023-04-28 NOTE — TRANSFER OF CARE
"Anesthesia Transfer of Care Note    Patient: Preston Kirby    Procedure(s) Performed: Procedure(s) (LRB):  COLONOSCOPY (N/A)    Patient location: GI    Anesthesia Type: general    Transport from OR: Transported from OR on room air with adequate spontaneous ventilation    Post pain: adequate analgesia    Post assessment: no apparent anesthetic complications and tolerated procedure well    Post vital signs: stable    Level of consciousness: awake, alert and oriented    Nausea/Vomiting: no nausea/vomiting    Complications: none    Transfer of care protocol was followed      Last vitals:   Visit Vitals  /79 (BP Location: Left arm, Patient Position: Lying)   Pulse 80   Temp 36.7 °C (98.1 °F) (Temporal)   Resp 14   Ht 5' 5" (1.651 m)   Wt 74.8 kg (165 lb)   SpO2 97%   BMI 27.46 kg/m²     "

## 2023-04-28 NOTE — PROVATION PATIENT INSTRUCTIONS
Discharge Summary/Instructions after an Endoscopic Procedure  Patient Name: Preston Kirby  Patient MRN: [id number]  Patient YOB: 1955  Friday, April 28, 2023  Ronal Ayala MD  Dear patient,  As a result of recent federal legislation (The Federal Cures Act), you may   receive lab or pathology results from your procedure in your [facility]   account before your physician is able to contact you. Your physician or   their representative will relay the results to you with their   recommendations at their soonest availability.  Thank you,  RESTRICTIONS:  During your procedure today, you received medications for sedation.  These   medications may affect your judgment, balance and coordination.  Therefore,   for 24 hours, you have the following restrictions:   - DO NOT drive a car, operate machinery, make legal/financial decisions,   sign important papers or drink alcohol.    ACTIVITY:  Today: no heavy lifting, straining or running due to procedural   sedation/anesthesia.  The following day: return to full activity including work.  DIET:  Eat and drink normally unless instructed otherwise.     TREATMENT FOR COMMON SIDE EFFECTS:  - Mild abdominal pain, nausea, belching, bloating or excessive gas:  rest,   eat lightly and use a heating pad.  - Sore Throat: treat with throat lozenges and/or gargle with warm salt   water.  - Because air was used during the procedure, expelling large amounts of air   from your rectum or belching is normal.  - If a bowel prep was taken, you may not have a bowel movement for 1-3 days.    This is normal.  SYMPTOMS TO WATCH FOR AND REPORT TO YOUR PHYSICIAN:  1. Abdominal pain or bloating, other than gas cramps.  2. Chest pain.  3. Back pain.  4. Signs of infection such as: chills or fever occurring within 24 hours   after the procedure.  5. Rectal bleeding, which would show as bright red, maroon, or black stools.   (A tablespoon of blood from the rectum is not serious, especially if    hemorrhoids are present.)  6. Vomiting.  7. Weakness or dizziness.  GO DIRECTLY TO THE NEAREST EMERGENCY ROOM IF YOU HAVE ANY OF THE FOLLOWING:      Difficulty breathing              Chills and/or fever over 101 F   Persistent vomiting and/or vomiting blood   Severe abdominal pain   Severe chest pain   Black, tarry stools   Bleeding- more than one tablespoon   Any other symptom or condition that you feel may need urgent attention  Your doctor recommends these additional instructions:  If any biopsies were taken, your doctors clinic will contact you in 1 to 2   weeks with any results.  - Patient has a contact number available for emergencies.  The signs and   symptoms of potential delayed complications were discussed with the   patient.  Return to normal activities tomorrow.  Written discharge   instructions were provided to the patient.   - High fiber diet.   - Continue present medications.   - Await pathology results.   - Repeat colonoscopy in 3 years for surveillance.   - Discharge patient to home (ambulatory).   - Return to GI office after studies are complete.  For questions, problems or results please call your physician - Ronal Ayala MD at Work:  [phone].  [facility] PHONE NUMBER: [phone]  IF A COMPLICATION OR EMERGENCY SITUATION ARISES AND YOU ARE UNABLE TO REACH   YOUR PHYSICIAN - GO DIRECTLY TO THE EMERGENCY ROOM.  Ronal Ayala MD  4/28/2023 9:43:41 AM  This report has been verified and signed electronically.  Dear patient,  As a result of recent federal legislation (The Federal Cures Act), you may   receive lab or pathology results from your procedure in your [facility]   account before your physician is able to contact you. Your physician or   their representative will relay the results to you with their   recommendations at their soonest availability.  Thank you,  PROVATION

## 2023-04-28 NOTE — ANESTHESIA PREPROCEDURE EVALUATION
04/28/2023  Preston Kirby is a 67 y.o., male.      Pre-op Assessment    I have reviewed the Patient Summary Reports.     I have reviewed the Nursing Notes. I have reviewed the NPO Status.   I have reviewed the Medications.     Review of Systems  Anesthesia Hx:  No problems with previous Anesthesia    Social:  Non-Smoker    Hematology/Oncology:         -- Cancer in past history:   Cardiovascular:   Hypertension, well controlled hyperlipidemia    Pulmonary:   Sleep Apnea    Renal/:   Chronic Renal Disease (gout) renal calculi    Hepatic/GI:   GERD    Musculoskeletal:   Arthritis   Spine Disorders: cervical    Neurological:   Neuromuscular Disease,    Endocrine:   Diabetes, well controlled, type 2        Physical Exam  General: Well nourished, Cooperative, Alert and Oriented    Airway:  Mallampati: II   Mouth Opening: Normal  TM Distance: Normal  Neck ROM: Normal ROM        Anesthesia Plan  Type of Anesthesia, risks & benefits discussed:    Anesthesia Type: Gen ETT, Gen Supraglottic Airway, Gen Natural Airway, MAC  Intra-op Monitoring Plan: Standard ASA Monitors  Post Op Pain Control Plan: multimodal analgesia  Induction:  IV  Airway Plan: Direct, Video and Fiberoptic, Post-Induction  Informed Consent: Informed consent signed with the Patient and all parties understand the risks and agree with anesthesia plan.  All questions answered.   ASA Score: 3    Ready For Surgery From Anesthesia Perspective.     .

## 2023-04-28 NOTE — ANESTHESIA POSTPROCEDURE EVALUATION
Anesthesia Post Evaluation    Patient: Preston Kirby    Procedure(s) Performed: Procedure(s) (LRB):  COLONOSCOPY (N/A)    Final Anesthesia Type: general      Patient location during evaluation: PACU  Patient participation: Yes- Able to Participate  Level of consciousness: awake and alert and oriented  Post-procedure vital signs: reviewed and stable  Pain management: adequate  Airway patency: patent    PONV status at discharge: No PONV  Anesthetic complications: no      Cardiovascular status: blood pressure returned to baseline and stable  Respiratory status: unassisted and spontaneous ventilation  Hydration status: euvolemic  Follow-up not needed.          Vitals Value Taken Time   /78 04/28/23 1000   Temp 36.7 °C (98.1 °F) 04/28/23 1000   Pulse 74 04/28/23 1000   Resp 16 04/28/23 1000   SpO2 97 % 04/28/23 1000         Event Time   Out of Recovery 10:05:40         Pain/Jose Francisco Score: Jose Francisco Score: 10 (4/28/2023 10:00 AM)

## 2023-04-28 NOTE — PLAN OF CARE
Vss, avtar po fluids, denies pain, ambulates easily. IV removed, catheter intact. Discharge instructions provided and states understanding. States ready to go home.  Discharged from facility with family per wheelchair.

## 2023-04-28 NOTE — H&P
CC: History of colon polyps    67 year old male with above. States that symptoms are absent, no alleviating/exacerbating factors. No family history of colorectal CA. Positive personal history of polyps. No bleeding or weight loss.     ROS:  No headache, no fever/chills, no chest pain/SOB, no nausea/vomiting/diarrhea/constipation/GI bleeding/abdominal pain, no dysuria/hematuria.    VSSAF   Exam:   Alert and oriented x 3; no apparent distress   PERRLA, sclera anicteric  CV: Regular rate/rhythm, normal PMI   Lungs: Clear bilaterally with no wheeze/rales   Abdomen: Soft, NT/ND, normal bowel sounds   Ext: No cyanosis, clubbing     Impression:   As above    Plan:   Proceed with endoscopy. Further recs to follow.

## 2023-05-01 VITALS
HEIGHT: 65 IN | WEIGHT: 165 LBS | OXYGEN SATURATION: 97 % | DIASTOLIC BLOOD PRESSURE: 78 MMHG | SYSTOLIC BLOOD PRESSURE: 129 MMHG | RESPIRATION RATE: 16 BRPM | BODY MASS INDEX: 27.49 KG/M2 | HEART RATE: 74 BPM | TEMPERATURE: 98 F

## 2023-05-02 DIAGNOSIS — R05.9 COUGH: ICD-10-CM

## 2023-05-02 RX ORDER — MONTELUKAST SODIUM 10 MG/1
TABLET ORAL
Qty: 90 TABLET | Refills: 2 | Status: SHIPPED | OUTPATIENT
Start: 2023-05-02 | End: 2024-03-04 | Stop reason: SDUPTHER

## 2023-05-02 NOTE — TELEPHONE ENCOUNTER
No care due was identified.  [facility] Embedded Care Due Messages. Reference number: [id number].   5/02/2023 12:01:36 PM CDT

## 2023-05-02 NOTE — TELEPHONE ENCOUNTER
Refill Decision Note   Preston Kirby  is requesting a refill authorization.  Brief Assessment and Rationale for Refill:  Approve     Medication Therapy Plan:       Medication Reconciliation Completed: No   Comments:     No Care Gaps recommended.     Note composed:12:03 PM 05/02/2023

## 2023-05-04 LAB
FINAL PATHOLOGIC DIAGNOSIS: NORMAL
GROSS: NORMAL
Lab: NORMAL

## 2023-06-13 RX ORDER — INDOMETHACIN 25 MG/1
CAPSULE ORAL
Qty: 60 CAPSULE | Refills: 0 | Status: SHIPPED | OUTPATIENT
Start: 2023-06-13 | End: 2023-07-17

## 2023-06-30 ENCOUNTER — TELEPHONE (OUTPATIENT)
Dept: FAMILY MEDICINE | Facility: CLINIC | Age: 68
End: 2023-06-30
Payer: MEDICARE

## 2023-07-17 RX ORDER — INDOMETHACIN 25 MG/1
CAPSULE ORAL
Qty: 60 CAPSULE | Refills: 0 | Status: SHIPPED | OUTPATIENT
Start: 2023-07-17 | End: 2023-08-22

## 2023-07-25 ENCOUNTER — PES CALL (OUTPATIENT)
Dept: ADMINISTRATIVE | Facility: CLINIC | Age: 68
End: 2023-07-25
Payer: MEDICARE

## 2023-08-22 RX ORDER — INDOMETHACIN 25 MG/1
CAPSULE ORAL
Qty: 120 CAPSULE | Refills: 0 | Status: SHIPPED | OUTPATIENT
Start: 2023-08-22 | End: 2023-10-27

## 2023-10-09 ENCOUNTER — PATIENT MESSAGE (OUTPATIENT)
Dept: ADMINISTRATIVE | Facility: HOSPITAL | Age: 68
End: 2023-10-09

## 2023-10-10 DIAGNOSIS — E11.9 TYPE 2 DIABETES MELLITUS WITHOUT COMPLICATION, UNSPECIFIED WHETHER LONG TERM INSULIN USE: ICD-10-CM

## 2023-10-27 RX ORDER — INDOMETHACIN 25 MG/1
CAPSULE ORAL
Qty: 90 CAPSULE | Refills: 0 | Status: SHIPPED | OUTPATIENT
Start: 2023-10-27 | End: 2024-03-04 | Stop reason: SDUPTHER

## 2023-10-31 LAB
COMMENTS: ABNORMAL
EST. AVERAGE GLUCOSE BLD GHB EST-MCNC: 128 MG/DL
HBA1C MFR BLD: 6.1 % (ref 4.8–5.6)

## 2024-03-04 ENCOUNTER — OFFICE VISIT (OUTPATIENT)
Dept: FAMILY MEDICINE | Facility: CLINIC | Age: 69
End: 2024-03-04
Payer: MEDICARE

## 2024-03-04 ENCOUNTER — LAB VISIT (OUTPATIENT)
Dept: LAB | Facility: HOSPITAL | Age: 69
End: 2024-03-04
Attending: INTERNAL MEDICINE
Payer: MEDICARE

## 2024-03-04 VITALS
OXYGEN SATURATION: 97 % | BODY MASS INDEX: 28.25 KG/M2 | HEART RATE: 70 BPM | WEIGHT: 169.56 LBS | SYSTOLIC BLOOD PRESSURE: 114 MMHG | HEIGHT: 65 IN | DIASTOLIC BLOOD PRESSURE: 70 MMHG | TEMPERATURE: 98 F

## 2024-03-04 DIAGNOSIS — Z00.00 ROUTINE MEDICAL EXAM: ICD-10-CM

## 2024-03-04 DIAGNOSIS — E78.6 HDL LIPOPROTEIN DEFICIENCY: ICD-10-CM

## 2024-03-04 DIAGNOSIS — Z12.5 SCREENING FOR PROSTATE CANCER: ICD-10-CM

## 2024-03-04 DIAGNOSIS — R05.9 COUGH: ICD-10-CM

## 2024-03-04 DIAGNOSIS — I10 ESSENTIAL HYPERTENSION: ICD-10-CM

## 2024-03-04 DIAGNOSIS — M1A.9XX0 CHRONIC GOUT WITHOUT TOPHUS, UNSPECIFIED CAUSE, UNSPECIFIED SITE: ICD-10-CM

## 2024-03-04 DIAGNOSIS — R79.89 ABNORMAL THYROID BLOOD TEST: ICD-10-CM

## 2024-03-04 DIAGNOSIS — Z86.010 HISTORY OF COLONIC POLYPS: ICD-10-CM

## 2024-03-04 DIAGNOSIS — K21.9 GASTROESOPHAGEAL REFLUX DISEASE, UNSPECIFIED WHETHER ESOPHAGITIS PRESENT: ICD-10-CM

## 2024-03-04 DIAGNOSIS — E11.8 CONTROLLED TYPE 2 DIABETES MELLITUS WITH COMPLICATION, WITHOUT LONG-TERM CURRENT USE OF INSULIN: ICD-10-CM

## 2024-03-04 DIAGNOSIS — Z91.018 FOOD ALLERGY: ICD-10-CM

## 2024-03-04 DIAGNOSIS — E78.5 HYPERLIPIDEMIA, UNSPECIFIED HYPERLIPIDEMIA TYPE: ICD-10-CM

## 2024-03-04 DIAGNOSIS — N40.0 BENIGN PROSTATIC HYPERPLASIA, UNSPECIFIED WHETHER LOWER URINARY TRACT SYMPTOMS PRESENT: ICD-10-CM

## 2024-03-04 DIAGNOSIS — R79.89 ABNORMAL LIVER FUNCTION TESTS: ICD-10-CM

## 2024-03-04 DIAGNOSIS — G47.33 OSA (OBSTRUCTIVE SLEEP APNEA): ICD-10-CM

## 2024-03-04 DIAGNOSIS — J44.9 CHRONIC OBSTRUCTIVE PULMONARY DISEASE, UNSPECIFIED COPD TYPE: ICD-10-CM

## 2024-03-04 DIAGNOSIS — Z00.00 ROUTINE MEDICAL EXAM: Primary | ICD-10-CM

## 2024-03-04 LAB
ALBUMIN SERPL BCP-MCNC: 4.2 G/DL (ref 3.5–5.2)
ALP SERPL-CCNC: 67 U/L (ref 55–135)
ALT SERPL W/O P-5'-P-CCNC: 18 U/L (ref 10–44)
ANION GAP SERPL CALC-SCNC: 12 MMOL/L (ref 8–16)
AST SERPL-CCNC: 23 U/L (ref 10–40)
BASOPHILS # BLD AUTO: 0.05 K/UL (ref 0–0.2)
BASOPHILS NFR BLD: 0.7 % (ref 0–1.9)
BILIRUB SERPL-MCNC: 0.7 MG/DL (ref 0.1–1)
BUN SERPL-MCNC: 16 MG/DL (ref 8–23)
CALCIUM SERPL-MCNC: 9.5 MG/DL (ref 8.7–10.5)
CHLORIDE SERPL-SCNC: 106 MMOL/L (ref 95–110)
CHOLEST SERPL-MCNC: 177 MG/DL (ref 120–199)
CHOLEST/HDLC SERPL: 3.7 {RATIO} (ref 2–5)
CO2 SERPL-SCNC: 24 MMOL/L (ref 23–29)
COMPLEXED PSA SERPL-MCNC: 1.7 NG/ML (ref 0–4)
CREAT SERPL-MCNC: 0.8 MG/DL (ref 0.5–1.4)
DIFFERENTIAL METHOD BLD: NORMAL
EOSINOPHIL # BLD AUTO: 0.3 K/UL (ref 0–0.5)
EOSINOPHIL NFR BLD: 4.7 % (ref 0–8)
ERYTHROCYTE [DISTWIDTH] IN BLOOD BY AUTOMATED COUNT: 13.6 % (ref 11.5–14.5)
EST. GFR  (NO RACE VARIABLE): >60 ML/MIN/1.73 M^2
ESTIMATED AVG GLUCOSE: 114 MG/DL (ref 68–131)
GLUCOSE SERPL-MCNC: 88 MG/DL (ref 70–110)
HBA1C MFR BLD: 5.6 % (ref 4–5.6)
HCT VFR BLD AUTO: 44 % (ref 40–54)
HDLC SERPL-MCNC: 48 MG/DL (ref 40–75)
HDLC SERPL: 27.1 % (ref 20–50)
HGB BLD-MCNC: 14.7 G/DL (ref 14–18)
IMM GRANULOCYTES # BLD AUTO: 0.02 K/UL (ref 0–0.04)
IMM GRANULOCYTES NFR BLD AUTO: 0.3 % (ref 0–0.5)
LDLC SERPL CALC-MCNC: 118.2 MG/DL (ref 63–159)
LYMPHOCYTES # BLD AUTO: 2 K/UL (ref 1–4.8)
LYMPHOCYTES NFR BLD: 28.6 % (ref 18–48)
MCH RBC QN AUTO: 30.2 PG (ref 27–31)
MCHC RBC AUTO-ENTMCNC: 33.4 G/DL (ref 32–36)
MCV RBC AUTO: 91 FL (ref 82–98)
MONOCYTES # BLD AUTO: 0.7 K/UL (ref 0.3–1)
MONOCYTES NFR BLD: 9.9 % (ref 4–15)
NEUTROPHILS # BLD AUTO: 3.9 K/UL (ref 1.8–7.7)
NEUTROPHILS NFR BLD: 55.8 % (ref 38–73)
NONHDLC SERPL-MCNC: 129 MG/DL
NRBC BLD-RTO: 0 /100 WBC
PLATELET # BLD AUTO: 211 K/UL (ref 150–450)
PMV BLD AUTO: 12.5 FL (ref 9.2–12.9)
POTASSIUM SERPL-SCNC: 4.3 MMOL/L (ref 3.5–5.1)
PROT SERPL-MCNC: 7.2 G/DL (ref 6–8.4)
RBC # BLD AUTO: 4.86 M/UL (ref 4.6–6.2)
SODIUM SERPL-SCNC: 142 MMOL/L (ref 136–145)
T4 FREE SERPL-MCNC: 0.86 NG/DL (ref 0.71–1.51)
TRIGL SERPL-MCNC: 54 MG/DL (ref 30–150)
TSH SERPL DL<=0.005 MIU/L-ACNC: 3.29 UIU/ML (ref 0.4–4)
URATE SERPL-MCNC: 6 MG/DL (ref 3.4–7)
WBC # BLD AUTO: 6.95 K/UL (ref 3.9–12.7)

## 2024-03-04 PROCEDURE — 3074F SYST BP LT 130 MM HG: CPT | Mod: CPTII,S$GLB,, | Performed by: INTERNAL MEDICINE

## 2024-03-04 PROCEDURE — 36415 COLL VENOUS BLD VENIPUNCTURE: CPT | Mod: PO | Performed by: INTERNAL MEDICINE

## 2024-03-04 PROCEDURE — 80061 LIPID PANEL: CPT | Performed by: INTERNAL MEDICINE

## 2024-03-04 PROCEDURE — 83036 HEMOGLOBIN GLYCOSYLATED A1C: CPT | Performed by: INTERNAL MEDICINE

## 2024-03-04 PROCEDURE — 99214 OFFICE O/P EST MOD 30 MIN: CPT | Mod: 25,S$GLB,, | Performed by: INTERNAL MEDICINE

## 2024-03-04 PROCEDURE — 3288F FALL RISK ASSESSMENT DOCD: CPT | Mod: CPTII,S$GLB,, | Performed by: INTERNAL MEDICINE

## 2024-03-04 PROCEDURE — 84550 ASSAY OF BLOOD/URIC ACID: CPT | Performed by: INTERNAL MEDICINE

## 2024-03-04 PROCEDURE — 80053 COMPREHEN METABOLIC PANEL: CPT | Performed by: INTERNAL MEDICINE

## 2024-03-04 PROCEDURE — 84439 ASSAY OF FREE THYROXINE: CPT | Performed by: INTERNAL MEDICINE

## 2024-03-04 PROCEDURE — 84153 ASSAY OF PSA TOTAL: CPT | Performed by: INTERNAL MEDICINE

## 2024-03-04 PROCEDURE — 99999 PR PBB SHADOW E&M-EST. PATIENT-LVL III: CPT | Mod: PBBFAC,,, | Performed by: INTERNAL MEDICINE

## 2024-03-04 PROCEDURE — 1125F AMNT PAIN NOTED PAIN PRSNT: CPT | Mod: CPTII,S$GLB,, | Performed by: INTERNAL MEDICINE

## 2024-03-04 PROCEDURE — 85025 COMPLETE CBC W/AUTO DIFF WBC: CPT | Performed by: INTERNAL MEDICINE

## 2024-03-04 PROCEDURE — 1101F PT FALLS ASSESS-DOCD LE1/YR: CPT | Mod: CPTII,S$GLB,, | Performed by: INTERNAL MEDICINE

## 2024-03-04 PROCEDURE — 4010F ACE/ARB THERAPY RXD/TAKEN: CPT | Mod: CPTII,S$GLB,, | Performed by: INTERNAL MEDICINE

## 2024-03-04 PROCEDURE — 99397 PER PM REEVAL EST PAT 65+ YR: CPT | Mod: S$GLB,,, | Performed by: INTERNAL MEDICINE

## 2024-03-04 PROCEDURE — 3008F BODY MASS INDEX DOCD: CPT | Mod: CPTII,S$GLB,, | Performed by: INTERNAL MEDICINE

## 2024-03-04 PROCEDURE — 3078F DIAST BP <80 MM HG: CPT | Mod: CPTII,S$GLB,, | Performed by: INTERNAL MEDICINE

## 2024-03-04 PROCEDURE — 84443 ASSAY THYROID STIM HORMONE: CPT | Performed by: INTERNAL MEDICINE

## 2024-03-04 PROCEDURE — 1159F MED LIST DOCD IN RCRD: CPT | Mod: CPTII,S$GLB,, | Performed by: INTERNAL MEDICINE

## 2024-03-04 RX ORDER — AMLODIPINE BESYLATE 5 MG/1
5 TABLET ORAL DAILY
Qty: 90 TABLET | Refills: 3 | Status: SHIPPED | OUTPATIENT
Start: 2024-03-04

## 2024-03-04 RX ORDER — PANTOPRAZOLE SODIUM 40 MG/1
TABLET, DELAYED RELEASE ORAL
Qty: 90 TABLET | Refills: 3 | Status: SHIPPED | OUTPATIENT
Start: 2024-03-04

## 2024-03-04 RX ORDER — MONTELUKAST SODIUM 10 MG/1
10 TABLET ORAL DAILY
Qty: 90 TABLET | Refills: 2 | Status: SHIPPED | OUTPATIENT
Start: 2024-03-04

## 2024-03-04 RX ORDER — IRBESARTAN 300 MG/1
300 TABLET ORAL NIGHTLY
Qty: 90 TABLET | Refills: 3 | Status: SHIPPED | OUTPATIENT
Start: 2024-03-04

## 2024-03-04 RX ORDER — ALLOPURINOL 100 MG/1
100 TABLET ORAL DAILY
Qty: 90 TABLET | Refills: 3 | Status: SHIPPED | OUTPATIENT
Start: 2024-03-04

## 2024-03-04 RX ORDER — CLOTRIMAZOLE AND BETAMETHASONE DIPROPIONATE 10; .64 MG/G; MG/G
CREAM TOPICAL 2 TIMES DAILY
Qty: 45 G | Refills: 3 | Status: SHIPPED | OUTPATIENT
Start: 2024-03-04

## 2024-03-04 RX ORDER — ALPRAZOLAM 0.5 MG/1
TABLET ORAL
COMMUNITY

## 2024-03-04 RX ORDER — INDOMETHACIN 25 MG/1
CAPSULE ORAL
Qty: 90 CAPSULE | Refills: 0 | Status: SHIPPED | OUTPATIENT
Start: 2024-03-04
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
## 2024-10-30 DIAGNOSIS — R05.9 COUGH: ICD-10-CM

## 2024-10-30 RX ORDER — MONTELUKAST SODIUM 10 MG/1
10 TABLET ORAL DAILY
Qty: 90 TABLET | Refills: 1 | Status: SHIPPED | OUTPATIENT
Start: 2024-10-30

## 2024-12-03 ENCOUNTER — PATIENT MESSAGE (OUTPATIENT)
Dept: ADMINISTRATIVE | Facility: HOSPITAL | Age: 69
End: 2024-12-03
Payer: MEDICARE

## 2024-12-18 RX ORDER — IRBESARTAN 300 MG/1
300 TABLET ORAL NIGHTLY
Qty: 90 TABLET | Refills: 0 | Status: SHIPPED | OUTPATIENT
Start: 2024-12-18

## 2024-12-18 RX ORDER — PANTOPRAZOLE SODIUM 40 MG/1
TABLET, DELAYED RELEASE ORAL
Qty: 90 TABLET | Refills: 0 | Status: SHIPPED | OUTPATIENT
Start: 2024-12-18

## 2024-12-18 RX ORDER — AMLODIPINE BESYLATE 5 MG/1
5 TABLET ORAL
Qty: 90 TABLET | Refills: 0 | Status: SHIPPED | OUTPATIENT
Start: 2024-12-18

## 2024-12-18 NOTE — TELEPHONE ENCOUNTER
Care Due:                  Date            Visit Type   Department     Provider  --------------------------------------------------------------------------------                                [facility]                              PRIMARY      MED/ INTERNAL  Karel Hood  Last Visit: 03-      CARE ([facility])   MED/ PEDS      Ehrensing  Next Visit: None Scheduled  None         None Found                                                            Last  Test          Frequency    Reason                     Performed    Due Date  --------------------------------------------------------------------------------    Office Visit  12 months..  indomethacin.............  03- 02-    CBC.........  12 months..  allopurinoL, indomethacin  03- 02-    CMP.........  12 months..  allopurinoL,               03- 02-                             indomethacin, irbesartan.    Uric Acid...  12 months..  allopurinoL..............  03- 02-    [facility] Embedded Care Due Messages. Reference number: [id number].   12/18/2024 3:33:36 AM CST

## 2024-12-18 NOTE — TELEPHONE ENCOUNTER
Provider Staff:  Action required for this patient    Requires appointment   Requires labs      Please see care gap opportunities below in Care Due Message.    Thanks!  [facility]     Appointments      Date Provider   Last Visit   3/4/2024 Karel Contreras MD   Next Visit   Visit date not found Karel Contreras MD     Refill Decision Note   Preston Kirby  is requesting a refill authorization.  Brief Assessment and Rationale for Refill:  Approve     Medication Therapy Plan:         Comments:     Note composed:12:47 PM 12/18/2024

## 2024-12-31 DIAGNOSIS — E11.9 TYPE 2 DIABETES MELLITUS WITHOUT COMPLICATION, UNSPECIFIED WHETHER LONG TERM INSULIN USE: ICD-10-CM

## 2025-01-02 ENCOUNTER — PATIENT OUTREACH (OUTPATIENT)
Dept: ADMINISTRATIVE | Facility: HOSPITAL | Age: 70
End: 2025-01-02
Payer: MEDICARE

## 2025-01-12 RX ORDER — ALLOPURINOL 100 MG/1
100 TABLET ORAL
Qty: 90 TABLET | Refills: 0 | Status: SHIPPED | OUTPATIENT
Start: 2025-01-12

## 2025-01-13 NOTE — TELEPHONE ENCOUNTER
No care due was identified.  [facility] Embedded Care Due Messages. Reference number: [id number].   1/12/2025 10:24:32 PM CST

## 2025-01-13 NOTE — TELEPHONE ENCOUNTER
Refill Decision Note   Preston Kofi  is requesting a refill authorization.  Brief Assessment and Rationale for Refill:  Approve     Medication Therapy Plan:         Comments:     Note composed:10:27 PM 01/12/2025

## 2025-03-01 RX ORDER — PANTOPRAZOLE SODIUM 40 MG/1
40 TABLET, DELAYED RELEASE ORAL
Qty: 90 TABLET | Refills: 0 | Status: SHIPPED | OUTPATIENT
Start: 2025-03-01

## 2025-03-01 RX ORDER — AMLODIPINE BESYLATE 5 MG/1
5 TABLET ORAL
Qty: 90 TABLET | Refills: 0 | Status: SHIPPED | OUTPATIENT
Start: 2025-03-01

## 2025-03-01 NOTE — TELEPHONE ENCOUNTER
Refill Routing Note   Medication(s) are not appropriate for processing by [facility] for the following reason(s):      Required labs outdated    [facility] action(s):  Approve  Defer Care Due:  Appointment due  Labs due            Appointments  past 12m or future 3m with PCP    Date Provider   Last Visit   3/4/2024 Karel Contreras MD   Next Visit   Visit date not found Karel Contreras MD   ED visits in past 90 days: 0        Note composed:8:27 AM 03/01/2025

## 2025-03-01 NOTE — TELEPHONE ENCOUNTER
Care Due:                  Date            Visit Type   Department     Provider  --------------------------------------------------------------------------------                                [facility]                              PRIMARY      MED/ INTERNAL  Karel Hood  Last Visit: 03-      CARE ([facility])   MED/ PEDS      Ehrensing  Next Visit: None Scheduled  None         None Found                                                            Last  Test          Frequency    Reason                     Performed    Due Date  --------------------------------------------------------------------------------    Office Visit  12 months..  allopurinoL, amLODIPine,   03- 02-                             indomethacin, irbesartan,                             montelukast..............    CBC.........  12 months..  allopurinoL, indomethacin  03- 02-    CMP.........  12 months..  allopurinoL,               03- 02-                             indomethacin, irbesartan.    Uric Acid...  12 months..  allopurinoL..............  03- 02-    [facility] Embedded Care Due Messages. Reference number: [id number].   3/01/2025 2:49:43 AM CST

## 2025-03-02 RX ORDER — IRBESARTAN 300 MG/1
300 TABLET ORAL NIGHTLY
Qty: 90 TABLET | Refills: 3 | Status: SHIPPED | OUTPATIENT
Start: 2025-03-02

## 2025-05-15 RX ORDER — PANTOPRAZOLE SODIUM 40 MG/1
40 TABLET, DELAYED RELEASE ORAL
Qty: 90 TABLET | Refills: 0 | Status: SHIPPED | OUTPATIENT
Start: 2025-05-15

## 2025-05-15 NOTE — TELEPHONE ENCOUNTER
Provider Staff:  Action required for this patient    Requires appointment   Requires labs      Please see care gap opportunities below in Care Due Message.    Thanks!  [facility]     Appointments      Date Provider   Last Visit   3/4/2024 Karel Contreras MD   Next Visit   Visit date not found Karel Contreras MD     Refill Decision Note   Preston Kirby  is requesting a refill authorization.  Brief Assessment and Rationale for Refill:  Approve     Medication Therapy Plan:         Comments:     Note composed:9:59 AM 05/15/2025

## 2025-05-15 NOTE — TELEPHONE ENCOUNTER
Care Due:                  Date            Visit Type   Department     Provider  --------------------------------------------------------------------------------                                [facility] MED                              PRIMARY      / INTERNAL MED Karel Hood  Last Visit: 03-      CARE ([facility])   / PEDS         Ehrensing  Next Visit: None Scheduled  None         None Found                                                            Last  Test          Frequency    Reason                     Performed    Due Date  --------------------------------------------------------------------------------    Office Visit  12 months..  allopurinoL, amLODIPine,   03- 02-                             indomethacin, irbesartan,                             montelukast..............    CBC.........  12 months..  allopurinoL, indomethacin  03- 02-    CMP.........  12 months..  allopurinoL,               03- 02-                             indomethacin, irbesartan.    Uric Acid...  12 months..  allopurinoL..............  03- 02-    [facility] Embedded Care Due Messages. Reference number: [id number].   5/15/2025 5:13:02 AM CDT

## 2025-07-27 NOTE — TELEPHONE ENCOUNTER
Care Due:                  Date            Visit Type   Department     Provider  --------------------------------------------------------------------------------                                [facility] MED                              PRIMARY      / INTERNAL MED Karel Hood  Last Visit: 03-      CARE ([facility])   / PEDS         Ehrensing  Next Visit: None Scheduled  None         None Found                                                            Last  Test          Frequency    Reason                     Performed    Due Date  --------------------------------------------------------------------------------    Office Visit  12 months..  allopurinoL, amLODIPine,   03- 02-                             indomethacin, irbesartan,                             montelukast..............    CBC.........  12 months..  allopurinoL, indomethacin  03- 02-    CMP.........  12 months..  allopurinoL,               03- 02-                             indomethacin, irbesartan.    Uric Acid...  12 months..  allopurinoL..............  03- 02-    [facility] Embedded Care Due Messages. Reference number: [id number].   7/27/2025 1:35:22 PM CDT

## 2025-07-28 RX ORDER — PANTOPRAZOLE SODIUM 40 MG/1
40 TABLET, DELAYED RELEASE ORAL
Qty: 30 TABLET | Refills: 0 | Status: SHIPPED | OUTPATIENT
Start: 2025-07-28